# Patient Record
Sex: FEMALE | Race: WHITE | Employment: FULL TIME | URBAN - NONMETROPOLITAN AREA
[De-identification: names, ages, dates, MRNs, and addresses within clinical notes are randomized per-mention and may not be internally consistent; named-entity substitution may affect disease eponyms.]

---

## 2017-03-03 LAB — GLUCOSE BLD-MCNC: 97 MG/DL (ref 70–100)

## 2018-10-30 RX ORDER — LEVOFLOXACIN 5 MG/ML
500 INJECTION, SOLUTION INTRAVENOUS
COMMUNITY
End: 2019-01-08

## 2018-10-30 RX ORDER — ALBUTEROL SULFATE 90 UG/1
2 AEROSOL, METERED RESPIRATORY (INHALATION) EVERY 6 HOURS PRN
Status: ON HOLD | COMMUNITY

## 2018-10-30 RX ORDER — SERTRALINE HYDROCHLORIDE 100 MG/1
100 TABLET, FILM COATED ORAL DAILY
Status: ON HOLD | COMMUNITY

## 2018-10-30 RX ORDER — FENOFIBRATE 134 MG/1
134 CAPSULE ORAL
Status: ON HOLD | COMMUNITY

## 2018-10-30 RX ORDER — LORATADINE 10 MG/1
10 TABLET ORAL DAILY
COMMUNITY
End: 2019-01-08

## 2018-10-30 RX ORDER — IPRATROPIUM BROMIDE AND ALBUTEROL SULFATE 2.5; .5 MG/3ML; MG/3ML
1 SOLUTION RESPIRATORY (INHALATION) EVERY 4 HOURS
Status: ON HOLD | COMMUNITY

## 2018-10-30 RX ORDER — CELECOXIB 200 MG/1
200 CAPSULE ORAL 2 TIMES DAILY
Status: ON HOLD | COMMUNITY

## 2018-10-30 RX ORDER — FLUTICASONE PROPIONATE 50 MCG
1 SPRAY, SUSPENSION (ML) NASAL DAILY
Status: ON HOLD | COMMUNITY

## 2018-10-30 RX ORDER — GABAPENTIN 400 MG/1
400 CAPSULE ORAL 3 TIMES DAILY
Status: ON HOLD | COMMUNITY

## 2018-10-30 RX ORDER — CROMOLYN SODIUM 40 MG/ML
1 SOLUTION/ DROPS OPHTHALMIC 4 TIMES DAILY
Status: ON HOLD | COMMUNITY

## 2018-10-30 RX ORDER — LISINOPRIL 10 MG/1
10 TABLET ORAL DAILY
Status: ON HOLD | COMMUNITY

## 2018-10-30 RX ORDER — BUDESONIDE AND FORMOTEROL FUMARATE DIHYDRATE 160; 4.5 UG/1; UG/1
2 AEROSOL RESPIRATORY (INHALATION) 2 TIMES DAILY
COMMUNITY
End: 2019-01-08

## 2019-01-08 ENCOUNTER — HOSPITAL ENCOUNTER (OUTPATIENT)
Age: 64
Setting detail: SPECIMEN
Discharge: HOME | End: 2019-01-08
Payer: COMMERCIAL

## 2019-01-08 ENCOUNTER — OFFICE VISIT (OUTPATIENT)
Dept: PAIN MANAGEMENT | Age: 64
End: 2019-01-08
Payer: COMMERCIAL

## 2019-01-08 VITALS
BODY MASS INDEX: 31.28 KG/M2 | HEART RATE: 84 BPM | HEIGHT: 62 IN | DIASTOLIC BLOOD PRESSURE: 68 MMHG | SYSTOLIC BLOOD PRESSURE: 112 MMHG | WEIGHT: 170 LBS

## 2019-01-08 DIAGNOSIS — M54.6 CHRONIC LEFT-SIDED THORACIC BACK PAIN: ICD-10-CM

## 2019-01-08 DIAGNOSIS — G89.29 CHRONIC LEFT-SIDED THORACIC BACK PAIN: ICD-10-CM

## 2019-01-08 DIAGNOSIS — Z79.891 ENCOUNTER FOR LONG-TERM OPIATE ANALGESIC USE: ICD-10-CM

## 2019-01-08 DIAGNOSIS — M51.34 DDD (DEGENERATIVE DISC DISEASE), THORACIC: ICD-10-CM

## 2019-01-08 DIAGNOSIS — Z02.83 ENCOUNTER FOR DRUG SCREENING: ICD-10-CM

## 2019-01-08 DIAGNOSIS — M51.34 BULGING OF THORACIC INTERVERTEBRAL DISC: Primary | ICD-10-CM

## 2019-01-08 DIAGNOSIS — M40.14 OTHER SECONDARY KYPHOSIS, THORACIC REGION: ICD-10-CM

## 2019-01-08 DIAGNOSIS — G89.29 ENCOUNTER FOR CHRONIC PAIN MANAGEMENT: ICD-10-CM

## 2019-01-08 PROCEDURE — G8417 CALC BMI ABV UP PARAM F/U: HCPCS | Performed by: NURSE PRACTITIONER

## 2019-01-08 PROCEDURE — 1036F TOBACCO NON-USER: CPT | Performed by: NURSE PRACTITIONER

## 2019-01-08 PROCEDURE — 80307 DRUG TEST PRSMV CHEM ANLYZR: CPT

## 2019-01-08 PROCEDURE — G8484 FLU IMMUNIZE NO ADMIN: HCPCS | Performed by: NURSE PRACTITIONER

## 2019-01-08 PROCEDURE — 3017F COLORECTAL CA SCREEN DOC REV: CPT | Performed by: NURSE PRACTITIONER

## 2019-01-08 PROCEDURE — 99203 OFFICE O/P NEW LOW 30 MIN: CPT | Performed by: NURSE PRACTITIONER

## 2019-01-08 PROCEDURE — G8427 DOCREV CUR MEDS BY ELIG CLIN: HCPCS | Performed by: NURSE PRACTITIONER

## 2019-01-08 RX ORDER — HYDROCODONE BITARTRATE AND ACETAMINOPHEN 7.5; 325 MG/1; MG/1
1 TABLET ORAL EVERY 6 HOURS PRN
Qty: 120 TABLET | Refills: 0 | Status: SHIPPED | OUTPATIENT
Start: 2019-01-08 | End: 2019-02-08 | Stop reason: SDUPTHER

## 2019-01-08 ASSESSMENT — ENCOUNTER SYMPTOMS
BACK PAIN: 1
SHORTNESS OF BREATH: 0
CONSTIPATION: 0

## 2019-01-08 ASSESSMENT — PATIENT HEALTH QUESTIONNAIRE - PHQ9
SUM OF ALL RESPONSES TO PHQ QUESTIONS 1-9: 1
SUM OF ALL RESPONSES TO PHQ QUESTIONS 1-9: 1
SUM OF ALL RESPONSES TO PHQ9 QUESTIONS 1 & 2: 1
2. FEELING DOWN, DEPRESSED OR HOPELESS: 0
1. LITTLE INTEREST OR PLEASURE IN DOING THINGS: 1

## 2019-01-11 LAB

## 2019-02-08 DIAGNOSIS — M40.14 OTHER SECONDARY KYPHOSIS, THORACIC REGION: ICD-10-CM

## 2019-02-08 DIAGNOSIS — M51.34 BULGING OF THORACIC INTERVERTEBRAL DISC: ICD-10-CM

## 2019-02-08 DIAGNOSIS — G89.29 CHRONIC LEFT-SIDED THORACIC BACK PAIN: ICD-10-CM

## 2019-02-08 DIAGNOSIS — M54.6 CHRONIC LEFT-SIDED THORACIC BACK PAIN: ICD-10-CM

## 2019-02-08 RX ORDER — HYDROCODONE BITARTRATE AND ACETAMINOPHEN 7.5; 325 MG/1; MG/1
1 TABLET ORAL EVERY 6 HOURS PRN
Qty: 120 TABLET | Refills: 0 | Status: SHIPPED | OUTPATIENT
Start: 2019-02-08 | End: 2019-03-26 | Stop reason: SDUPTHER

## 2019-03-25 DIAGNOSIS — M40.14 OTHER SECONDARY KYPHOSIS, THORACIC REGION: ICD-10-CM

## 2019-03-25 DIAGNOSIS — M51.34 BULGING OF THORACIC INTERVERTEBRAL DISC: ICD-10-CM

## 2019-03-25 DIAGNOSIS — G89.29 CHRONIC LEFT-SIDED THORACIC BACK PAIN: ICD-10-CM

## 2019-03-25 DIAGNOSIS — M54.6 CHRONIC LEFT-SIDED THORACIC BACK PAIN: ICD-10-CM

## 2019-03-25 RX ORDER — HYDROCODONE BITARTRATE AND ACETAMINOPHEN 7.5; 325 MG/1; MG/1
1 TABLET ORAL EVERY 6 HOURS PRN
Qty: 120 TABLET | Refills: 0 | Status: SHIPPED | OUTPATIENT
Start: 2019-03-25 | End: 2019-04-24

## 2019-03-26 RX ORDER — HYDROCODONE BITARTRATE AND ACETAMINOPHEN 7.5; 325 MG/1; MG/1
1 TABLET ORAL EVERY 6 HOURS PRN
Qty: 120 TABLET | Refills: 0 | Status: SHIPPED | OUTPATIENT
Start: 2019-03-26 | End: 2019-04-25

## 2022-08-01 LAB
GLUCOSE BLD-MCNC: 99 MG/DL (ref 70–100)
SARS-COV-2, POC: NEGATIVE

## 2022-11-03 ENCOUNTER — APPOINTMENT (OUTPATIENT)
Dept: GENERAL RADIOLOGY | Age: 67
DRG: 190 | End: 2022-11-03
Attending: STUDENT IN AN ORGANIZED HEALTH CARE EDUCATION/TRAINING PROGRAM
Payer: MEDICARE

## 2022-11-03 ENCOUNTER — HOSPITAL ENCOUNTER (INPATIENT)
Age: 67
LOS: 4 days | Discharge: HOME | DRG: 190 | End: 2022-11-07
Attending: STUDENT IN AN ORGANIZED HEALTH CARE EDUCATION/TRAINING PROGRAM | Admitting: STUDENT IN AN ORGANIZED HEALTH CARE EDUCATION/TRAINING PROGRAM
Payer: MEDICARE

## 2022-11-03 DIAGNOSIS — J44.1 COPD EXACERBATION (HCC): Primary | ICD-10-CM

## 2022-11-03 PROBLEM — J96.01 ACUTE RESPIRATORY FAILURE WITH HYPOXIA (HCC): Status: ACTIVE | Noted: 2022-11-03

## 2022-11-03 PROBLEM — I50.9 DECOMPENSATED HEART FAILURE (HCC): Status: ACTIVE | Noted: 2022-11-03

## 2022-11-03 LAB
ALBUMIN SERPL-MCNC: 4 G/DL (ref 3.5–5.2)
ANION GAP SERPL CALCULATED.3IONS-SCNC: 14 MMOL/L (ref 9–17)
BUN BLDV-MCNC: 13 MG/DL (ref 8–23)
CALCIUM SERPL-MCNC: 9.6 MG/DL (ref 8.6–10.4)
CHLORIDE BLD-SCNC: 100 MMOL/L (ref 98–107)
CO2: 27 MMOL/L (ref 20–31)
CREAT SERPL-MCNC: 0.46 MG/DL (ref 0.5–0.9)
GFR SERPL CREATININE-BSD FRML MDRD: >60 ML/MIN/1.73M2
GLUCOSE BLD-MCNC: 140 MG/DL (ref 70–99)
HCT VFR BLD CALC: 38.2 % (ref 36.3–47.1)
HEMOGLOBIN: 12.3 G/DL (ref 11.9–15.1)
MAGNESIUM: 1.7 MG/DL (ref 1.6–2.6)
MCH RBC QN AUTO: 27.6 PG (ref 25.2–33.5)
MCHC RBC AUTO-ENTMCNC: 32.2 G/DL (ref 28.4–34.8)
MCV RBC AUTO: 85.8 FL (ref 82.6–102.9)
NRBC AUTOMATED: 0 PER 100 WBC
PDW BLD-RTO: 13.6 % (ref 11.8–14.4)
PHOSPHORUS: 2.9 MG/DL (ref 2.6–4.5)
PLATELET # BLD: 289 K/UL (ref 138–453)
PMV BLD AUTO: 10.1 FL (ref 8.1–13.5)
POTASSIUM SERPL-SCNC: 3.1 MMOL/L (ref 3.7–5.3)
RBC # BLD: 4.45 M/UL (ref 3.95–5.11)
SODIUM BLD-SCNC: 141 MMOL/L (ref 135–144)
THYROXINE, FREE: 1.32 NG/DL (ref 0.93–1.7)
TROPONIN, HIGH SENSITIVITY: 10 NG/L (ref 0–14)
TROPONIN, HIGH SENSITIVITY: 8 NG/L (ref 0–14)
TSH SERPL DL<=0.05 MIU/L-ACNC: 0.37 UIU/ML (ref 0.3–5)
WBC # BLD: 13.3 K/UL (ref 3.5–11.3)

## 2022-11-03 PROCEDURE — 6370000000 HC RX 637 (ALT 250 FOR IP): Performed by: INTERNAL MEDICINE

## 2022-11-03 PROCEDURE — 2500000003 HC RX 250 WO HCPCS: Performed by: INTERNAL MEDICINE

## 2022-11-03 PROCEDURE — 80069 RENAL FUNCTION PANEL: CPT

## 2022-11-03 PROCEDURE — 36415 COLL VENOUS BLD VENIPUNCTURE: CPT

## 2022-11-03 PROCEDURE — 94640 AIRWAY INHALATION TREATMENT: CPT

## 2022-11-03 PROCEDURE — 6360000002 HC RX W HCPCS: Performed by: INTERNAL MEDICINE

## 2022-11-03 PROCEDURE — 6370000000 HC RX 637 (ALT 250 FOR IP): Performed by: NURSE PRACTITIONER

## 2022-11-03 PROCEDURE — 94660 CPAP INITIATION&MGMT: CPT

## 2022-11-03 PROCEDURE — 2060000000 HC ICU INTERMEDIATE R&B

## 2022-11-03 PROCEDURE — 84443 ASSAY THYROID STIM HORMONE: CPT

## 2022-11-03 PROCEDURE — 2700000000 HC OXYGEN THERAPY PER DAY

## 2022-11-03 PROCEDURE — 85027 COMPLETE CBC AUTOMATED: CPT

## 2022-11-03 PROCEDURE — 71045 X-RAY EXAM CHEST 1 VIEW: CPT

## 2022-11-03 PROCEDURE — 83735 ASSAY OF MAGNESIUM: CPT

## 2022-11-03 PROCEDURE — 84439 ASSAY OF FREE THYROXINE: CPT

## 2022-11-03 PROCEDURE — 2580000003 HC RX 258: Performed by: NURSE PRACTITIONER

## 2022-11-03 PROCEDURE — 99222 1ST HOSP IP/OBS MODERATE 55: CPT | Performed by: INTERNAL MEDICINE

## 2022-11-03 PROCEDURE — 94761 N-INVAS EAR/PLS OXIMETRY MLT: CPT

## 2022-11-03 PROCEDURE — 84484 ASSAY OF TROPONIN QUANT: CPT

## 2022-11-03 RX ORDER — FUROSEMIDE 10 MG/ML
40 INJECTION INTRAMUSCULAR; INTRAVENOUS DAILY
Status: DISCONTINUED | OUTPATIENT
Start: 2022-11-04 | End: 2022-11-05

## 2022-11-03 RX ORDER — SODIUM CHLORIDE 9 MG/ML
INJECTION, SOLUTION INTRAVENOUS PRN
Status: DISCONTINUED | OUTPATIENT
Start: 2022-11-03 | End: 2022-11-07 | Stop reason: HOSPADM

## 2022-11-03 RX ORDER — BUDESONIDE AND FORMOTEROL FUMARATE DIHYDRATE 160; 4.5 UG/1; UG/1
2 AEROSOL RESPIRATORY (INHALATION) 2 TIMES DAILY
Status: DISCONTINUED | OUTPATIENT
Start: 2022-11-03 | End: 2022-11-07 | Stop reason: HOSPADM

## 2022-11-03 RX ORDER — METHYLPREDNISOLONE SODIUM SUCCINATE 40 MG/ML
40 INJECTION, POWDER, LYOPHILIZED, FOR SOLUTION INTRAMUSCULAR; INTRAVENOUS EVERY 6 HOURS
Status: DISCONTINUED | OUTPATIENT
Start: 2022-11-03 | End: 2022-11-03

## 2022-11-03 RX ORDER — ACETAMINOPHEN 650 MG/1
650 SUPPOSITORY RECTAL EVERY 6 HOURS PRN
Status: DISCONTINUED | OUTPATIENT
Start: 2022-11-03 | End: 2022-11-07 | Stop reason: HOSPADM

## 2022-11-03 RX ORDER — POLYETHYLENE GLYCOL 3350 17 G/17G
17 POWDER, FOR SOLUTION ORAL DAILY PRN
Status: DISCONTINUED | OUTPATIENT
Start: 2022-11-03 | End: 2022-11-07 | Stop reason: HOSPADM

## 2022-11-03 RX ORDER — ASPIRIN 81 MG/1
81 TABLET ORAL DAILY
Status: DISCONTINUED | OUTPATIENT
Start: 2022-11-03 | End: 2022-11-07 | Stop reason: HOSPADM

## 2022-11-03 RX ORDER — MAGNESIUM SULFATE HEPTAHYDRATE 40 MG/ML
4000 INJECTION, SOLUTION INTRAVENOUS ONCE
Status: COMPLETED | OUTPATIENT
Start: 2022-11-03 | End: 2022-11-03

## 2022-11-03 RX ORDER — FUROSEMIDE 10 MG/ML
40 INJECTION INTRAMUSCULAR; INTRAVENOUS 2 TIMES DAILY
Status: DISCONTINUED | OUTPATIENT
Start: 2022-11-03 | End: 2022-11-03

## 2022-11-03 RX ORDER — ALBUTEROL SULFATE 2.5 MG/3ML
2.5 SOLUTION RESPIRATORY (INHALATION)
Status: DISCONTINUED | OUTPATIENT
Start: 2022-11-03 | End: 2022-11-07 | Stop reason: HOSPADM

## 2022-11-03 RX ORDER — SODIUM CHLORIDE 0.9 % (FLUSH) 0.9 %
5-40 SYRINGE (ML) INJECTION PRN
Status: DISCONTINUED | OUTPATIENT
Start: 2022-11-03 | End: 2022-11-07 | Stop reason: HOSPADM

## 2022-11-03 RX ORDER — HYDROCODONE BITARTRATE AND ACETAMINOPHEN 5; 325 MG/1; MG/1
1 TABLET ORAL EVERY 6 HOURS PRN
Status: DISCONTINUED | OUTPATIENT
Start: 2022-11-03 | End: 2022-11-07 | Stop reason: HOSPADM

## 2022-11-03 RX ORDER — ONDANSETRON 4 MG/1
4 TABLET, ORALLY DISINTEGRATING ORAL EVERY 8 HOURS PRN
Status: DISCONTINUED | OUTPATIENT
Start: 2022-11-03 | End: 2022-11-07 | Stop reason: HOSPADM

## 2022-11-03 RX ORDER — ONDANSETRON 2 MG/ML
4 INJECTION INTRAMUSCULAR; INTRAVENOUS EVERY 6 HOURS PRN
Status: DISCONTINUED | OUTPATIENT
Start: 2022-11-03 | End: 2022-11-07 | Stop reason: HOSPADM

## 2022-11-03 RX ORDER — LISINOPRIL 5 MG/1
5 TABLET ORAL DAILY
Status: DISCONTINUED | OUTPATIENT
Start: 2022-11-03 | End: 2022-11-07 | Stop reason: HOSPADM

## 2022-11-03 RX ORDER — CARVEDILOL 3.12 MG/1
3.12 TABLET ORAL 2 TIMES DAILY WITH MEALS
Status: DISCONTINUED | OUTPATIENT
Start: 2022-11-03 | End: 2022-11-03

## 2022-11-03 RX ORDER — METHYLPREDNISOLONE SODIUM SUCCINATE 40 MG/ML
40 INJECTION, POWDER, LYOPHILIZED, FOR SOLUTION INTRAMUSCULAR; INTRAVENOUS EVERY 8 HOURS
Status: DISCONTINUED | OUTPATIENT
Start: 2022-11-03 | End: 2022-11-04

## 2022-11-03 RX ORDER — ENOXAPARIN SODIUM 100 MG/ML
40 INJECTION SUBCUTANEOUS DAILY
Status: DISCONTINUED | OUTPATIENT
Start: 2022-11-03 | End: 2022-11-07 | Stop reason: HOSPADM

## 2022-11-03 RX ORDER — SODIUM CHLORIDE 0.9 % (FLUSH) 0.9 %
5-40 SYRINGE (ML) INJECTION EVERY 12 HOURS SCHEDULED
Status: DISCONTINUED | OUTPATIENT
Start: 2022-11-03 | End: 2022-11-07 | Stop reason: HOSPADM

## 2022-11-03 RX ORDER — PREDNISONE 20 MG/1
40 TABLET ORAL DAILY
Status: DISCONTINUED | OUTPATIENT
Start: 2022-11-05 | End: 2022-11-03

## 2022-11-03 RX ORDER — ACETAMINOPHEN 325 MG/1
650 TABLET ORAL EVERY 6 HOURS PRN
Status: DISCONTINUED | OUTPATIENT
Start: 2022-11-03 | End: 2022-11-07 | Stop reason: HOSPADM

## 2022-11-03 RX ORDER — PANTOPRAZOLE SODIUM 40 MG/1
40 TABLET, DELAYED RELEASE ORAL
Status: DISCONTINUED | OUTPATIENT
Start: 2022-11-04 | End: 2022-11-07 | Stop reason: HOSPADM

## 2022-11-03 RX ORDER — GABAPENTIN 400 MG/1
400 CAPSULE ORAL 3 TIMES DAILY
Status: DISCONTINUED | OUTPATIENT
Start: 2022-11-03 | End: 2022-11-07 | Stop reason: HOSPADM

## 2022-11-03 RX ORDER — FLUOXETINE HYDROCHLORIDE 20 MG/1
20 CAPSULE ORAL DAILY
Status: DISCONTINUED | OUTPATIENT
Start: 2022-11-03 | End: 2022-11-03

## 2022-11-03 RX ORDER — OMEPRAZOLE 20 MG/1
20 CAPSULE, DELAYED RELEASE ORAL 2 TIMES DAILY
Status: DISCONTINUED | OUTPATIENT
Start: 2022-11-03 | End: 2022-11-03 | Stop reason: CLARIF

## 2022-11-03 RX ORDER — IPRATROPIUM BROMIDE AND ALBUTEROL SULFATE 2.5; .5 MG/3ML; MG/3ML
1 SOLUTION RESPIRATORY (INHALATION)
Status: DISCONTINUED | OUTPATIENT
Start: 2022-11-03 | End: 2022-11-07 | Stop reason: HOSPADM

## 2022-11-03 RX ADMIN — SERTRALINE 100 MG: 50 TABLET, FILM COATED ORAL at 13:48

## 2022-11-03 RX ADMIN — HYDROCODONE BITARTRATE AND ACETAMINOPHEN 1 TABLET: 5; 325 TABLET ORAL at 13:39

## 2022-11-03 RX ADMIN — CARVEDILOL 3.12 MG: 3.12 TABLET, FILM COATED ORAL at 11:31

## 2022-11-03 RX ADMIN — IPRATROPIUM BROMIDE AND ALBUTEROL SULFATE 1 AMPULE: .5; 2.5 SOLUTION RESPIRATORY (INHALATION) at 20:45

## 2022-11-03 RX ADMIN — POTASSIUM BICARBONATE 40 MEQ: 782 TABLET, EFFERVESCENT ORAL at 21:57

## 2022-11-03 RX ADMIN — IPRATROPIUM BROMIDE AND ALBUTEROL SULFATE 1 AMPULE: .5; 2.5 SOLUTION RESPIRATORY (INHALATION) at 12:30

## 2022-11-03 RX ADMIN — BUDESONIDE AND FORMOTEROL FUMARATE DIHYDRATE 2 PUFF: 160; 4.5 AEROSOL RESPIRATORY (INHALATION) at 20:45

## 2022-11-03 RX ADMIN — HYDROCODONE BITARTRATE AND ACETAMINOPHEN 1 TABLET: 5; 325 TABLET ORAL at 20:14

## 2022-11-03 RX ADMIN — GABAPENTIN 400 MG: 400 CAPSULE ORAL at 13:16

## 2022-11-03 RX ADMIN — Medication 81 MG: at 13:13

## 2022-11-03 RX ADMIN — METHYLPREDNISOLONE SODIUM SUCCINATE 40 MG: 40 INJECTION, POWDER, FOR SOLUTION INTRAMUSCULAR; INTRAVENOUS at 11:33

## 2022-11-03 RX ADMIN — CEFEPIME 2000 MG: 2 INJECTION, POWDER, FOR SOLUTION INTRAVENOUS at 23:34

## 2022-11-03 RX ADMIN — SODIUM CHLORIDE, PRESERVATIVE FREE 10 ML: 5 INJECTION INTRAVENOUS at 11:29

## 2022-11-03 RX ADMIN — POTASSIUM BICARBONATE 40 MEQ: 782 TABLET, EFFERVESCENT ORAL at 13:23

## 2022-11-03 RX ADMIN — MAGNESIUM SULFATE HEPTAHYDRATE 4000 MG: 40 INJECTION, SOLUTION INTRAVENOUS at 13:19

## 2022-11-03 RX ADMIN — METHYLPREDNISOLONE SODIUM SUCCINATE 40 MG: 40 INJECTION, POWDER, FOR SOLUTION INTRAMUSCULAR; INTRAVENOUS at 18:11

## 2022-11-03 RX ADMIN — GABAPENTIN 400 MG: 400 CAPSULE ORAL at 21:57

## 2022-11-03 RX ADMIN — CEFEPIME 2000 MG: 2 INJECTION, POWDER, FOR SOLUTION INTRAVENOUS at 11:48

## 2022-11-03 RX ADMIN — LISINOPRIL 5 MG: 5 TABLET ORAL at 11:31

## 2022-11-03 ASSESSMENT — PAIN SCALES - GENERAL
PAINLEVEL_OUTOF10: 10
PAINLEVEL_OUTOF10: 9
PAINLEVEL_OUTOF10: 6

## 2022-11-03 ASSESSMENT — ENCOUNTER SYMPTOMS
CHEST TIGHTNESS: 0
SHORTNESS OF BREATH: 1
CHOKING: 1
BACK PAIN: 1
WHEEZING: 1
COUGH: 1
VOMITING: 0
NAUSEA: 0
CONSTIPATION: 0
APNEA: 0
DIARRHEA: 0
ABDOMINAL DISTENTION: 0

## 2022-11-03 NOTE — CARE COORDINATION
11/03/22 1633   Service Assessment   Patient Orientation Alert and Oriented;Person;Place;Situation   Cognition Alert   History Provided By Patient   Primary Caregiver Self   Accompanied By/Relationship family   Support Systems Children;Family Members   Patient's Healthcare Decision Maker is:   (karen Calhoun)   PCP Verified by CM Yes  Anthony Pacheco)   Last Visit to PCP Within last 3 months   Prior Functional Level Independent in ADLs/IADLs   Current Functional Level Independent in ADLs/IADLs   Can patient return to prior living arrangement Yes   Ability to make needs known: Good   Financial Resources Medicare   Social/Functional History   Lives With Alone   Type of [address] One level   [address] to enter with rails; Ramped entrance   Entrance Stairs - Number of Steps 1   Entrance Stairs - Rails Both   Bathroom Shower/Tub Walk-in shower;Doors   Bathroom Toilet Handicap height   Bathroom Equipment Grab bars around toilet   Bathroom Accessibility Accessible   Home Equipment Oxygen  (from [facility])   Receives Help From Family   ADL Assistance Independent   Ambulation Assistance Independent   Transfer Assistance Independent   Active  Yes   Mode of Transportation [employer]   Education GED   Occupation Retired   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; Oxygen Therapy   Current DME Prior to Arrival Oxygen Therapy (Comment)   Potential Assistance Needed N/A   DME Ordered? No   Potential Assistance Purchasing Medications No   Type of Home Care Services None   Patient expects to be discharged to: House   One/Two Story Residence One story   History of falls?  0   Services At/After Discharge   Transition of Care Consult (CM Consult) Discharge Planning   Mode of Transport at Discharge   (family to transport)   Confirm Follow Up Transport Family   Condition of Participation: Discharge Planning   The Plan for Transition of Care is related to the following treatment goals: breathe easier   The Patient and/or Patient Representative was provided with a Choice of Provider? Patient   The Patient and/Or Patient Representative agree with the Discharge Plan? Yes   Freedom of Choice list was provided with basic dialogue that supports the patient's individualized plan of care/goals, treatment preferences, and shares the quality data associated with the providers? Yes   Transitional planning-talked with patient. Plan is to go home alone, has ride. Verified address, emergency contact, and insurance. On O2 at home from [facility] [place].

## 2022-11-03 NOTE — H&P
[facility]  Office: [address], DO, Vick [place], DO, Laquita Beets, DO, Thea Angel Blood, DO, Jocelyne Ross MD, Yrn King MD, Johnnie Claude, MD, Aletha Sosa MD,  Philomena Boggs MD, Truett Burkitt, MD, Glenn Godfrey, DO, Jane Copeland MD,  Jer Rae MD, Ishan Graham MD, Darlyn Bowens, DO, Mayra Anderson MD, Rosibel Viveros MD, Caity Garcia, DO, Jaswant Newman MD, Diana Valdez MD, Kaye Latif MD, Shaila Daniels MD, Farahd Gerard, DO, Benoit Martin MD, Nely Roy MD, Andreas Scott, CNP,  Michael Alas, CNP, Mouna Garcia, CNP, Sergio Brady, CNP,  Tanesha Pedro, DNP, Marylen Net, CNP, Ray Bianchi, CNP, Alina Ayers, CNP, Ana Maria Freeman, CNP, Soto Browning, CNP, Joanna Aden PA-C, Kathie Ball, CNS, Adela Cesar, DNP, Raghav Wallace, CNP, Sameer Croft, CNP, Neeta Graves, CNP         [facility]    HISTORY AND PHYSICAL EXAMINATION            Date:   11/3/2022  Patient name:  Albania Cadena  Date of admission:  11/3/2022 10:43 AM  MRN:   [id number]  Account:  [de-identified]  YOB: 1955  PCP:    Serge Tejada  Room:   1404/4996-11  Code Status:    DNR-CCA    Chief Complaint:     Shortness of Breath    History Obtained From:     patient    History of Present Illness:     Albania Cadena is a 75 y.o. female presents to the emergency department for 3 to 4-day history of shortness of breath with cough and congestion and streaking of blood in sputum. Patient is normally on 4 L nasal cannula for COPD. Patient presented to the emergency department for further evaluation, was transferred for worsening shortness of breath. Initially she presented with orthopnea and lower extremity swelling and after diuresis the swelling improved however patient remained short of breath.   We briefly discussed treatment for her COPD exacerbation, advised using BiPAP to help with lung recruitment, initially she was hesitant but agreed after agreeing to using it intermittently. Patient was discussed CODE STATUS and she stated she has a DNR in place and agreed to DNR CCA. Patient also complaining of streaking of blood with her cough, has not worsened, is not purulent hemoptysis. Patient currently on nonrebreather    Past Medical History:     Past Medical History:   Diagnosis Date    Acid indigestion     Chest pain     Chronic back pain     Chronic obstructive lung disease (HCC)     Coronary atherosclerosis     Depression     Disseminated idiopathic skeletal hyperostosis     Fatigue     Hard of hearing     Hip pain     Hyperlipidemia     Hypertension     Old myocardial infarction     Seasonal allergies     history of        Past Surgical History:     Past Surgical History:   Procedure Laterality Date    CORONARY ANGIOPLASTY WITH STENT PLACEMENT  1998        Medications Prior to Admission:     Prior to Admission medications    Medication Sig Start Date End Date Taking?  Authorizing Provider   fluticasone-salmeterol (ADVAIR) 500-50 MCG/DOSE diskus inhaler Inhale 1 puff into the lungs every 12 hours    Historical Provider, MD   aspirin 81 MG tablet Take 81 mg by mouth daily    Historical Provider, MD   calcium carbonate (CALTRATE 600) 1500 (600 Ca) MG TABS tablet Take 600 mg by mouth daily    Historical Provider, MD   celecoxib (CELEBREX) 200 MG capsule Take 200 mg by mouth 2 times daily    Historical Provider, MD   cromolyn (OPTICROM) 4 % ophthalmic solution 1 drop 4 times daily    Historical Provider, MD   diclofenac sodium 1 % GEL Apply 2 g topically 2 times daily    Historical Provider, MD   fenofibrate micronized (LOFIBRA) 134 MG capsule Take 134 mg by mouth every morning (before breakfast)    Historical Provider, MD   fluticasone (FLONASE) 50 MCG/ACT nasal spray 1 spray by Each Nare route daily    Historical Provider, MD   gabapentin (NEURONTIN) 400 MG capsule Take 400 mg by mouth 3 times daily. Anatoliy Zurita Historical Provider, MD   ipratropium-albuterol (DUONEB) 0.5-2.5 (3) MG/3ML SOLN nebulizer solution Inhale 1 vial into the lungs every 4 hours    Historical Provider, MD   lisinopril (PRINIVIL;ZESTRIL) 10 MG tablet Take 10 mg by mouth daily    Historical Provider, MD   metoprolol tartrate (LOPRESSOR) 25 MG tablet Take 25 mg by mouth 2 times daily    Historical Provider, MD   sertraline (ZOLOFT) 100 MG tablet Take 100 mg by mouth daily    Historical Provider, MD   albuterol sulfate  (90 Base) MCG/ACT inhaler Inhale 2 puffs into the lungs every 6 hours as needed for Wheezing    Historical Provider, MD   omeprazole (PRILOSEC) 20 MG capsule Take 20 mg by mouth 2 times daily. Historical Provider, MD   doxepin (SINEQUAN) 10 MG capsule Take 10 mg by mouth nightly. Historical Provider, MD   FLUoxetine (PROZAC) 20 MG capsule Take 20 mg by mouth daily. Historical Provider, MD   Hydrocodone-Acetaminophen (VICODIN PO) Take 7.5-325 mg by mouth 4 times daily as needed     Historical Provider, MD Long Estrog-Medroxyprogest Ace (PREMPRO PO) Take  by mouth. Historical Provider, MD        Allergies:     Augmentin [amoxicillin-pot clavulanate], Iv dye [iodides], and Talwin [pentazocine]    Social History:     Tobacco:    reports that she has quit smoking. Her smoking use included cigarettes. She has never used smokeless tobacco.  Alcohol:      reports no history of alcohol use. Drug Use:  reports no history of drug use. Family History:     Family History   Family history unknown: Yes       Review of Systems:     Positive and Negative as described in HPI. Review of Systems   Constitutional:  Positive for activity change. Negative for appetite change, diaphoresis, fatigue and fever. Respiratory:  Positive for cough, choking, shortness of breath and wheezing. Negative for apnea and chest tightness. Cardiovascular:  Positive for leg swelling. Negative for chest pain and palpitations. Gastrointestinal:  Negative for abdominal distention, constipation, diarrhea, nausea and vomiting. Endocrine: Negative for polydipsia, polyphagia and polyuria. Musculoskeletal:  Positive for back pain. Negative for gait problem, joint swelling, myalgias and neck stiffness. Neurological:  Positive for light-headedness. Negative for dizziness, speech difficulty and weakness. Psychiatric/Behavioral:  Negative for agitation. Physical Exam:   /74   Pulse (!) 106   Temp 98.7 °F (37.1 °C) (Oral)   Resp 17   Ht 5' 3\" (1.6 m)   Wt 170 lb (77.1 kg)   SpO2 (!) 88%   BMI 30.11 kg/m²   Temp (24hrs), Av.7 °F (37.1 °C), Min:98.7 °F (37.1 °C), Max:98.7 °F (37.1 °C)    No results for input(s): POCGLU in the last 72 hours. No intake or output data in the 24 hours ending 22 1135    Physical Exam  Constitutional:       General: She is in acute distress. Appearance: She is ill-appearing. HENT:      Head: Normocephalic and atraumatic. Nose: Nose normal. No congestion. Mouth/Throat:      Mouth: Mucous membranes are moist.      Pharynx: Oropharynx is clear. Eyes:      Conjunctiva/sclera: Conjunctivae normal.      Pupils: Pupils are equal, round, and reactive to light. Cardiovascular:      Rate and Rhythm: Normal rate and regular rhythm. Heart sounds: No murmur heard. No friction rub. No gallop. Pulmonary:      Effort: Respiratory distress present. Breath sounds: No stridor. No wheezing, rhonchi or rales. Comments: Diminished breath sounds  Chest:      Chest wall: No tenderness. Abdominal:      General: There is no distension. Tenderness: There is no abdominal tenderness. There is no guarding or rebound. Hernia: No hernia is present. Genitourinary:     Rectum: Guaiac result negative. Musculoskeletal:         General: No swelling or deformity. Cervical back: No rigidity or tenderness. Lymphadenopathy:      Cervical: No cervical adenopathy. Skin:     Capillary Refill: Capillary refill takes less than 2 seconds. Coloration: Skin is not jaundiced. Findings: No bruising or lesion. Neurological:      General: No focal deficit present. Mental Status: She is alert. Mental status is at baseline. Cranial Nerves: No cranial nerve deficit. Motor: No weakness. Investigations:      Laboratory Testing:  No results found for this or any previous visit (from the past 24 hour(s)). Imaging/Diagnostics:  No results found. Assessment :      Hospital Problems             Last Modified POA    * (Principal) COPD exacerbation ([facility] [address] 11/3/2022 Yes    Decompensated heart failure ([facility] [address] 11/3/2022 Yes    Acute respiratory failure with hypoxia ([facility] [address] 11/3/2022 Yes       Plan:     Patient status inpatient in the Progressive Unit/Step down    Acute on chronic respiratory failure with hypoxia -patient currently requiring nonrebreather, will start intermittent BiPAP and allow meals off BiPAP. Possibly multifactorial due to COPD versus CHF. Patient admits to lower extremity swelling but also has a history of COPD on chronic oxygen. COPD exacerbation-start steroids and DuoNeb treatments with BiPAP. Wean as tolerated, baseline oxygen of 4 L nasal cannula  Elevated BNP-minimal fluid on CT, check chest x-ray, continue diuresis for now  Obesity    Consultations:   [facility] TO HEART FAILURE NURSE/COORDINATOR   Patient is admitted as inpatient status because of co-morbidities listed above, severity of signs and symptoms as outlined, requirement for current medical therapies and most importantly because of direct risk to patient if care not provided in a hospital setting. Expected length of stay > 48 hours.     Jaern Sosa DO  11/3/2022  11:35 AM    Copy sent to Dr. Sharan Santiago

## 2022-11-04 LAB
ALBUMIN SERPL-MCNC: 3.9 G/DL (ref 3.5–5.2)
ANION GAP SERPL CALCULATED.3IONS-SCNC: 9 MMOL/L (ref 9–17)
BUN BLDV-MCNC: 18 MG/DL (ref 8–23)
CALCIUM SERPL-MCNC: 9 MG/DL (ref 8.6–10.4)
CHLORIDE BLD-SCNC: 104 MMOL/L (ref 98–107)
CHOLESTEROL/HDL RATIO: 3.4
CHOLESTEROL: 132 MG/DL
CO2: 28 MMOL/L (ref 20–31)
CREAT SERPL-MCNC: 0.36 MG/DL (ref 0.5–0.9)
GFR SERPL CREATININE-BSD FRML MDRD: >60 ML/MIN/1.73M2
GLUCOSE BLD-MCNC: 138 MG/DL (ref 70–99)
HCT VFR BLD CALC: 37.8 % (ref 36.3–47.1)
HDLC SERPL-MCNC: 39 MG/DL
HEMOGLOBIN: 12.1 G/DL (ref 11.9–15.1)
LDL CHOLESTEROL: 68 MG/DL (ref 0–130)
LV EF: 52 %
LVEF MODALITY: NORMAL
MAGNESIUM: 2.6 MG/DL (ref 1.6–2.6)
MCH RBC QN AUTO: 27.9 PG (ref 25.2–33.5)
MCHC RBC AUTO-ENTMCNC: 32 G/DL (ref 28.4–34.8)
MCV RBC AUTO: 87.3 FL (ref 82.6–102.9)
NRBC AUTOMATED: 0 PER 100 WBC
PDW BLD-RTO: 14 % (ref 11.8–14.4)
PHOSPHORUS: 2.7 MG/DL (ref 2.6–4.5)
PLATELET # BLD: 275 K/UL (ref 138–453)
PMV BLD AUTO: 9.9 FL (ref 8.1–13.5)
POTASSIUM SERPL-SCNC: 4.7 MMOL/L (ref 3.7–5.3)
RBC # BLD: 4.33 M/UL (ref 3.95–5.11)
SODIUM BLD-SCNC: 141 MMOL/L (ref 135–144)
TRIGL SERPL-MCNC: 125 MG/DL
WBC # BLD: 14.3 K/UL (ref 3.5–11.3)

## 2022-11-04 PROCEDURE — 6370000000 HC RX 637 (ALT 250 FOR IP): Performed by: INTERNAL MEDICINE

## 2022-11-04 PROCEDURE — 99233 SBSQ HOSP IP/OBS HIGH 50: CPT | Performed by: STUDENT IN AN ORGANIZED HEALTH CARE EDUCATION/TRAINING PROGRAM

## 2022-11-04 PROCEDURE — 93306 TTE W/DOPPLER COMPLETE: CPT

## 2022-11-04 PROCEDURE — 6360000002 HC RX W HCPCS: Performed by: INTERNAL MEDICINE

## 2022-11-04 PROCEDURE — 2060000000 HC ICU INTERMEDIATE R&B

## 2022-11-04 PROCEDURE — 2500000003 HC RX 250 WO HCPCS: Performed by: INTERNAL MEDICINE

## 2022-11-04 PROCEDURE — 94640 AIRWAY INHALATION TREATMENT: CPT

## 2022-11-04 PROCEDURE — 6360000002 HC RX W HCPCS: Performed by: NURSE PRACTITIONER

## 2022-11-04 PROCEDURE — 83735 ASSAY OF MAGNESIUM: CPT

## 2022-11-04 PROCEDURE — 94660 CPAP INITIATION&MGMT: CPT

## 2022-11-04 PROCEDURE — 6370000000 HC RX 637 (ALT 250 FOR IP): Performed by: NURSE PRACTITIONER

## 2022-11-04 PROCEDURE — 85027 COMPLETE CBC AUTOMATED: CPT

## 2022-11-04 PROCEDURE — 80061 LIPID PANEL: CPT

## 2022-11-04 PROCEDURE — 94761 N-INVAS EAR/PLS OXIMETRY MLT: CPT

## 2022-11-04 PROCEDURE — 2580000003 HC RX 258: Performed by: NURSE PRACTITIONER

## 2022-11-04 PROCEDURE — 2700000000 HC OXYGEN THERAPY PER DAY

## 2022-11-04 PROCEDURE — 6370000000 HC RX 637 (ALT 250 FOR IP): Performed by: STUDENT IN AN ORGANIZED HEALTH CARE EDUCATION/TRAINING PROGRAM

## 2022-11-04 PROCEDURE — 80069 RENAL FUNCTION PANEL: CPT

## 2022-11-04 PROCEDURE — 36415 COLL VENOUS BLD VENIPUNCTURE: CPT

## 2022-11-04 RX ORDER — ECHINACEA PURPUREA EXTRACT 125 MG
1 TABLET ORAL PRN
Status: DISCONTINUED | OUTPATIENT
Start: 2022-11-04 | End: 2022-11-07 | Stop reason: HOSPADM

## 2022-11-04 RX ORDER — PREDNISONE 20 MG/1
40 TABLET ORAL DAILY
Status: COMPLETED | OUTPATIENT
Start: 2022-11-04 | End: 2022-11-06

## 2022-11-04 RX ORDER — FLUTICASONE PROPIONATE 50 MCG
2 SPRAY, SUSPENSION (ML) NASAL DAILY
Status: DISCONTINUED | OUTPATIENT
Start: 2022-11-04 | End: 2022-11-07 | Stop reason: HOSPADM

## 2022-11-04 RX ORDER — PREDNISONE 20 MG/1
20 TABLET ORAL DAILY
Status: DISCONTINUED | OUTPATIENT
Start: 2022-11-10 | End: 2022-11-07 | Stop reason: HOSPADM

## 2022-11-04 RX ORDER — PREDNISONE 10 MG/1
10 TABLET ORAL DAILY
Status: DISCONTINUED | OUTPATIENT
Start: 2022-11-13 | End: 2022-11-07 | Stop reason: HOSPADM

## 2022-11-04 RX ADMIN — METHYLPREDNISOLONE SODIUM SUCCINATE 40 MG: 40 INJECTION, POWDER, FOR SOLUTION INTRAMUSCULAR; INTRAVENOUS at 03:31

## 2022-11-04 RX ADMIN — METOPROLOL TARTRATE 25 MG: 25 TABLET ORAL at 08:41

## 2022-11-04 RX ADMIN — FLUTICASONE PROPIONATE 2 SPRAY: 50 SPRAY, METERED NASAL at 10:43

## 2022-11-04 RX ADMIN — CEFEPIME 2000 MG: 2 INJECTION, POWDER, FOR SOLUTION INTRAVENOUS at 11:14

## 2022-11-04 RX ADMIN — HYDROCODONE BITARTRATE AND ACETAMINOPHEN 1 TABLET: 5; 325 TABLET ORAL at 18:30

## 2022-11-04 RX ADMIN — BUDESONIDE AND FORMOTEROL FUMARATE DIHYDRATE 2 PUFF: 160; 4.5 AEROSOL RESPIRATORY (INHALATION) at 08:03

## 2022-11-04 RX ADMIN — FUROSEMIDE 40 MG: 10 INJECTION, SOLUTION INTRAMUSCULAR; INTRAVENOUS at 08:43

## 2022-11-04 RX ADMIN — BUDESONIDE AND FORMOTEROL FUMARATE DIHYDRATE 2 PUFF: 160; 4.5 AEROSOL RESPIRATORY (INHALATION) at 21:00

## 2022-11-04 RX ADMIN — IPRATROPIUM BROMIDE AND ALBUTEROL SULFATE 1 AMPULE: .5; 2.5 SOLUTION RESPIRATORY (INHALATION) at 08:04

## 2022-11-04 RX ADMIN — GABAPENTIN 400 MG: 400 CAPSULE ORAL at 20:06

## 2022-11-04 RX ADMIN — SODIUM CHLORIDE, PRESERVATIVE FREE 10 ML: 5 INJECTION INTRAVENOUS at 20:06

## 2022-11-04 RX ADMIN — IPRATROPIUM BROMIDE AND ALBUTEROL SULFATE 1 AMPULE: .5; 2.5 SOLUTION RESPIRATORY (INHALATION) at 11:48

## 2022-11-04 RX ADMIN — PREDNISONE 40 MG: 20 TABLET ORAL at 10:42

## 2022-11-04 RX ADMIN — ENOXAPARIN SODIUM 40 MG: 100 INJECTION SUBCUTANEOUS at 08:43

## 2022-11-04 RX ADMIN — IPRATROPIUM BROMIDE AND ALBUTEROL SULFATE 1 AMPULE: .5; 2.5 SOLUTION RESPIRATORY (INHALATION) at 21:00

## 2022-11-04 RX ADMIN — LISINOPRIL 5 MG: 5 TABLET ORAL at 08:41

## 2022-11-04 RX ADMIN — PANTOPRAZOLE SODIUM 40 MG: 40 TABLET, DELAYED RELEASE ORAL at 08:42

## 2022-11-04 RX ADMIN — SERTRALINE 100 MG: 50 TABLET, FILM COATED ORAL at 08:41

## 2022-11-04 RX ADMIN — POTASSIUM BICARBONATE 40 MEQ: 782 TABLET, EFFERVESCENT ORAL at 08:43

## 2022-11-04 RX ADMIN — SODIUM CHLORIDE, PRESERVATIVE FREE 10 ML: 5 INJECTION INTRAVENOUS at 08:43

## 2022-11-04 RX ADMIN — Medication 81 MG: at 08:42

## 2022-11-04 RX ADMIN — GABAPENTIN 400 MG: 400 CAPSULE ORAL at 14:10

## 2022-11-04 RX ADMIN — POTASSIUM BICARBONATE 40 MEQ: 782 TABLET, EFFERVESCENT ORAL at 20:06

## 2022-11-04 RX ADMIN — GABAPENTIN 400 MG: 400 CAPSULE ORAL at 08:42

## 2022-11-04 RX ADMIN — HYDROCODONE BITARTRATE AND ACETAMINOPHEN 1 TABLET: 5; 325 TABLET ORAL at 08:42

## 2022-11-04 RX ADMIN — IPRATROPIUM BROMIDE AND ALBUTEROL SULFATE 1 AMPULE: .5; 2.5 SOLUTION RESPIRATORY (INHALATION) at 15:37

## 2022-11-04 ASSESSMENT — PAIN DESCRIPTION - LOCATION
LOCATION: SACRUM;COCCYX
LOCATION: SACRUM;COCCYX

## 2022-11-04 ASSESSMENT — PAIN DESCRIPTION - DESCRIPTORS
DESCRIPTORS: DISCOMFORT
DESCRIPTORS: DISCOMFORT

## 2022-11-04 ASSESSMENT — ENCOUNTER SYMPTOMS
DIARRHEA: 0
RHINORRHEA: 1
CONSTIPATION: 0
FACIAL SWELLING: 0
CHOKING: 0
EYE DISCHARGE: 0
SINUS PRESSURE: 1
APNEA: 0
BACK PAIN: 0
COUGH: 1
EYE ITCHING: 0
CHEST TIGHTNESS: 0
ABDOMINAL DISTENTION: 0
ABDOMINAL PAIN: 0
SHORTNESS OF BREATH: 1
COLOR CHANGE: 0

## 2022-11-04 ASSESSMENT — PAIN SCALES - GENERAL
PAINLEVEL_OUTOF10: 9
PAINLEVEL_OUTOF10: 8

## 2022-11-04 ASSESSMENT — PAIN - FUNCTIONAL ASSESSMENT: PAIN_FUNCTIONAL_ASSESSMENT: PREVENTS OR INTERFERES SOME ACTIVE ACTIVITIES AND ADLS

## 2022-11-04 NOTE — PROGRESS NOTES
[address] [facility] [address]  PROGRESS NOTE    Shift date: 11/4/2022   Shift day: Friday   Shift # 1    Room # 7073/1125-95   Name: Denys Kimball                Hoahaoism: [facility] of Zoroastrian: None    Referral: Routine Visit    Admit Date & Time: 11/3/2022 10:43 AM    Assessment:  Denys Kimball is a 75 y.o. female in the hospital because of \"decompensated heart failure\". Upon entering the room writer observes patient sitting up in bed, room darkened, TV on. She muted TV and engaged  in conversation about her health and family. When asked, pt said she is [employer] but does not attend a Judaism. Pt asked  to get her purse so she could get hearing aid batteries. Intervention:  Writer introduced self and title as  Writer offered space for patient  to express feelings, needs, and concerns and provided a ministry presence.  assisted pt with purse and put it back in cupboard when she was finished.  changed pt's Sabianism in chart from \"none\" to \"Restoration\".  assured her of prayers. Outcome:  Patient appeared to enjoy conversation and expressed appreciation for visit. Plan:  Chaplains will remain available to offer spiritual and emotional support as needed.       Electronically signed by Dimitris Teran on 11/4/2022 at 1:03 PM.  Chinedu Machado  [phone]        11/04/22 1301   Encounter Summary   Service Provided For: Patient   Referral/Consult From: [facility]   Support System Children   Last Encounter  11/04/22   Complexity of Encounter Low   Begin Time 1238   End Time  1248   Total Time Calculated 10 min   Encounter    Type Initial Screen/Assessment   Assessment/Intervention/Outcome   Assessment Calm;Coping   Intervention Active listening;Explored/Affirmed feelings, thoughts, concerns;Prayer (assurance of)/[place]   Outcome Engaged in conversation;Expressed feelings, needs, and concerns;Expressed Gratitude;Receptive

## 2022-11-04 NOTE — PLAN OF CARE
Problem: Discharge Planning  Goal: Discharge to home or other facility with appropriate resources  11/3/2022 2133 by Chad Garcia RN  Outcome: Progressing  11/3/2022 1058 by Jose Ramon Arnold RN  Outcome: Progressing     Problem: Pain  Goal: Verbalizes/displays adequate comfort level or baseline comfort level  Outcome: Progressing     Problem: Safety - Adult  Goal: Free from fall injury  Outcome: Progressing     Problem: ABCDS Injury Assessment  Goal: Absence of physical injury  Outcome: Progressing     Problem: Respiratory - Adult  Goal: Achieves optimal ventilation and oxygenation  11/3/2022 2133 by Chad Garcia RN  Outcome: Progressing  11/3/2022 1209 by Nhung Garcia RCP  Outcome: Progressing

## 2022-11-04 NOTE — PROGRESS NOTES
[facility]  Office: [address], DO, Johnathan Ramos, DO, Javier Landaverde, DO, Felipe Morfin Blood, DO, Katrina Puckett MD, Shiloh Medina MD, Jorge Winter MD, Aracely Izquierdo MD,  Suzanne Oneill MD, Rian Levin MD, Sebas Desai, DO, Mamie Gomes MD,  Abhinav Hernandez MD, Les Moreno MD, Kerline Valencia, DO, Juve Chavez MD, Chandler Lombard, MD, Leonides Palmer, DO, Mary Ocampo MD, Madie Jimenez MD, Nadege Ramirez MD, Satnam Aguiar MD, Manny Chung DO, Rubina Mcfarland MD, Basim Ulloa MD, Verona Murillo, Fernand Nyhan, CNP, Neri Lundy, CNP, Lois Daniels, CNP,  Arpan Sanders, DNP, Ladonna Mercado, CNP, Robbie Madden, CNP, Harpreet Zhu, CNP, Aditay Licona, CNP, Dheeraj Vásquez, CNP, Chidi Frye PALupilloC, Marcus Landis, CNS, Collette Salinas, DNP, Soila Garcia, CNP, Keturah Baig, CNP, Imani Jane, CNP         [address]    Progress Note    11/4/2022    9:48 AM    Name:   Maylin Brown  MRN:     [id number]     [place]:      [de-identified]   Room:   [address] [place] [address] Day:  1  Admit Date:  11/3/2022 10:43 AM    PCP:   Jennifer Moreno  Code Status:  DNR-CCA    Subjective:     C/C: shortness of breath  Interval History Status: improved. Patient seen and examined. On high flow, some nasal congestion, but breathing has improved. No wheezing today, plan for echo. Brief History:     75year old female with history of COPD chronically on 4L nasal cannula, presented to the ER with 3-4 of shortness of breath, patient required non re breather and high flow nasal cannula and has improved. Patient weaning down on high flow nasal cannula. Echo ordered due to cocnern for possible mixed COPD/CHF picture because respiratory effort has improed with aggressive diuresis. Review of Systems:     Review of Systems   Constitutional:  Negative for activity change, appetite change, chills and fever. HENT:  Positive for rhinorrhea and sinus pressure. Negative for congestion, ear pain, facial swelling and mouth sores. Eyes:  Negative for discharge and itching. Respiratory:  Positive for cough and shortness of breath. Negative for apnea, choking and chest tightness. Cardiovascular:  Negative for chest pain and leg swelling. Gastrointestinal:  Negative for abdominal distention, abdominal pain, constipation and diarrhea. Endocrine: Negative for cold intolerance, polyphagia and polyuria. Genitourinary:  Negative for difficulty urinating, dysuria and flank pain. Musculoskeletal:  Negative for arthralgias, back pain and joint swelling. Skin:  Negative for color change and wound. Neurological:  Negative for dizziness, seizures, light-headedness and headaches. Psychiatric/Behavioral:  Negative for agitation, behavioral problems and self-injury. Medications: Allergies: Allergies   Allergen Reactions    Augmentin [Amoxicillin-Pot Clavulanate] Hives    Iv Dye [Iodides]     Talwin [Pentazocine]      Migraine.        Current Meds:   Scheduled Meds:    fluticasone  2 spray Each Nostril Daily    sodium chloride flush  5-40 mL IntraVENous 2 times per day    enoxaparin  40 mg SubCUTAneous Daily    lisinopril  5 mg Oral Daily    ipratropium-albuterol  1 ampule Inhalation Q4H WA    methylPREDNISolone  40 mg IntraVENous Q8H    cefepime  2,000 mg IntraVENous Q12H    furosemide  40 mg IntraVENous Daily    aspirin  81 mg Oral Daily    budesonide-formoterol  2 puff Inhalation BID    gabapentin  400 mg Oral TID    metoprolol tartrate  25 mg Oral BID    pantoprazole  40 mg Oral QAM AC    sertraline  100 mg Oral Daily    potassium bicarb-citric acid  40 mEq Oral BID     Continuous Infusions:    sodium chloride       PRN Meds: sodium chloride, sodium chloride flush, sodium chloride, ondansetron **OR** ondansetron, polyethylene glycol, acetaminophen **OR** acetaminophen, albuterol, HYDROcodone 5 mg - acetaminophen    Data:     Past Medical History:   has a past medical history of Acid indigestion, Chest pain, Chronic back pain, Chronic obstructive lung disease ([address] Coronary atherosclerosis, Depression, Disseminated idiopathic skeletal hyperostosis, Fatigue, Hard of hearing, Hip pain, Hyperlipidemia, Hypertension, Old myocardial infarction, and Seasonal allergies. Social History:   reports that she has quit smoking. Her smoking use included cigarettes. She has never used smokeless tobacco. She reports that she does not drink alcohol and does not use drugs. Family History:   Family History   Family history unknown: Yes       Vitals:  /68   Pulse 69   Temp 97.5 °F (36.4 °C) (Axillary)   Resp 18   Ht 5' 3\" (1.6 m)   Wt 170 lb (77.1 kg)   SpO2 95%   BMI 30.11 kg/m²   Temp (24hrs), Av.7 °F (36.5 °C), Min:97 °F (36.1 °C), Max:98.7 °F (37.1 °C)    No results for input(s): POCGLU in the last 72 hours. I/O (24Hr):     Intake/Output Summary (Last 24 hours) at 2022 0948  Last data filed at 11/3/2022 1848  Gross per 24 hour   Intake --   Output 400 ml   Net -400 ml       Labs:  Hematology:  Recent Labs     22  1143 22  0623   WBC 13.3* 14.3*   RBC 4.45 4.33   HGB 12.3 12.1   HCT 38.2 37.8   MCV 85.8 87.3   MCH 27.6 27.9   MCHC 32.2 32.0   RDW 13.6 14.0    275   MPV 10.1 9.9     Chemistry:  Recent Labs     22  1143 22  1436 22  0623     --  141   K 3.1*  --  4.7     --  104   CO2 27  --  28   GLUCOSE 140*  --  138*   BUN 13  --  18   CREATININE 0.46*  --  0.36*   MG 1.7  --  2.6   ANIONGAP 14  --  9   LABGLOM >60  --  >60   CALCIUM 9.6  --  9.0   PHOS 2.9  --  2.7   TROPHS 8 10  --      Recent Labs     22  1143 22  0623   LABALBU 4.0 3.9   TSH 0.37  --    CHOL  --  132   HDL  --  39*   LDLCHOLESTEROL  --  68   CHOLHDLRATIO  --  3.4   TRIG  --  125     ABG:No results found for: POCPH, PHART, PH, POCPCO2, VKE3NLT, PCO2, POCPO2, PO2ART, PO2, POCHCO3, IXG2SBD, HCO3, NBEA, PBEA, BEART, BE, THGBART, THB, FSM1DNE, IKBJ7LCI, A4QCAQAY, O2SAT, FIO2  No results found for: SPECIAL  No results found for: CULTURE    Radiology:  XR CHEST (SINGLE VIEW FRONTAL)    Result Date: 11/3/2022  Interstitial prominence and bilateral pulmonary opacities, concerning for edema or pneumonia. Cardiomegaly. Physical Examination:        Physical Exam  Vitals reviewed. Constitutional:       General: She is not in acute distress. Appearance: She is ill-appearing. Interventions: She is not intubated. HENT:      Head: Normocephalic and atraumatic. Right Ear: External ear normal.      Left Ear: External ear normal.      Nose: Congestion and rhinorrhea present. Comments: High flow nasal cannula in place     Mouth/Throat:      Mouth: Mucous membranes are moist.   Eyes:      Extraocular Movements: Extraocular movements intact. Pupils: Pupils are equal, round, and reactive to light. Cardiovascular:      Rate and Rhythm: Normal rate and regular rhythm. Pulses: Normal pulses. Heart sounds: No murmur heard. Pulmonary:      Effort: Tachypnea and prolonged expiration present. No respiratory distress. She is not intubated. Breath sounds: Decreased air movement present. Examination of the right-upper field reveals decreased breath sounds. Examination of the left-upper field reveals decreased breath sounds. Examination of the right-middle field reveals decreased breath sounds. Examination of the right-lower field reveals decreased breath sounds. Examination of the left-lower field reveals decreased breath sounds. Decreased breath sounds present. Abdominal:      General: There is no distension. Palpations: Abdomen is soft. Musculoskeletal:      Cervical back: Neck supple. Right lower leg: No edema. Left lower leg: No edema. Skin:     General: Skin is warm and dry.       Capillary Refill: Capillary refill takes less than 2 seconds. Coloration: Skin is pale. Neurological:      General: No focal deficit present. Mental Status: She is alert and oriented to person, place, and time. Psychiatric:         Mood and Affect: Mood normal.         Behavior: Behavior normal.       Assessment:        Hospital Problems             Last Modified POA    * (Principal) COPD exacerbation ([facility] [address] 11/3/2022 Yes    Decompensated heart failure ([facility] [address] 11/3/2022 Yes    Acute respiratory failure with hypoxia ([facility] [address] 11/3/2022 Yes       Plan:        Acute on chronic COPD exacerbation. Solumedrol changed to prednisone taper, continue IV antibiotis, try to wean down oxygen as tolerating, incentive spirometry  Concern for decompensated heart failure, follow up Echo  Discussed with patient, if able to be weaned down to home 4L nsasal cannula will plan to have patient follow up with pulmonology and cardiology as outpatient.      Chantal Richard MD  11/4/2022  9:48 AM

## 2022-11-05 ENCOUNTER — APPOINTMENT (OUTPATIENT)
Dept: CT IMAGING | Age: 67
DRG: 190 | End: 2022-11-05
Attending: STUDENT IN AN ORGANIZED HEALTH CARE EDUCATION/TRAINING PROGRAM
Payer: MEDICARE

## 2022-11-05 LAB
ALBUMIN SERPL-MCNC: 3.7 G/DL (ref 3.5–5.2)
ANION GAP SERPL CALCULATED.3IONS-SCNC: 9 MMOL/L (ref 9–17)
BUN BLDV-MCNC: 17 MG/DL (ref 8–23)
CALCIUM SERPL-MCNC: 9 MG/DL (ref 8.6–10.4)
CHLORIDE BLD-SCNC: 100 MMOL/L (ref 98–107)
CO2: 29 MMOL/L (ref 20–31)
CREAT SERPL-MCNC: 0.4 MG/DL (ref 0.5–0.9)
GFR SERPL CREATININE-BSD FRML MDRD: >60 ML/MIN/1.73M2
GLUCOSE BLD-MCNC: 86 MG/DL (ref 70–99)
HCT VFR BLD CALC: 41.2 % (ref 36.3–47.1)
HEMOGLOBIN: 12.8 G/DL (ref 11.9–15.1)
MAGNESIUM: 2.2 MG/DL (ref 1.6–2.6)
MCH RBC QN AUTO: 27.7 PG (ref 25.2–33.5)
MCHC RBC AUTO-ENTMCNC: 31.1 G/DL (ref 28.4–34.8)
MCV RBC AUTO: 89.2 FL (ref 82.6–102.9)
NRBC AUTOMATED: 0 PER 100 WBC
PDW BLD-RTO: 14.1 % (ref 11.8–14.4)
PHOSPHORUS: 2.5 MG/DL (ref 2.6–4.5)
PLATELET # BLD: 236 K/UL (ref 138–453)
PMV BLD AUTO: 10.2 FL (ref 8.1–13.5)
POTASSIUM SERPL-SCNC: 4.9 MMOL/L (ref 3.7–5.3)
PRO-BNP: 1413 PG/ML
RBC # BLD: 4.62 M/UL (ref 3.95–5.11)
SODIUM BLD-SCNC: 138 MMOL/L (ref 135–144)
WBC # BLD: 12.8 K/UL (ref 3.5–11.3)

## 2022-11-05 PROCEDURE — 83880 ASSAY OF NATRIURETIC PEPTIDE: CPT

## 2022-11-05 PROCEDURE — 6360000002 HC RX W HCPCS: Performed by: INTERNAL MEDICINE

## 2022-11-05 PROCEDURE — 6360000002 HC RX W HCPCS: Performed by: STUDENT IN AN ORGANIZED HEALTH CARE EDUCATION/TRAINING PROGRAM

## 2022-11-05 PROCEDURE — 2500000003 HC RX 250 WO HCPCS: Performed by: INTERNAL MEDICINE

## 2022-11-05 PROCEDURE — 6370000000 HC RX 637 (ALT 250 FOR IP): Performed by: NURSE PRACTITIONER

## 2022-11-05 PROCEDURE — 85027 COMPLETE CBC AUTOMATED: CPT

## 2022-11-05 PROCEDURE — 94761 N-INVAS EAR/PLS OXIMETRY MLT: CPT

## 2022-11-05 PROCEDURE — 71250 CT THORAX DX C-: CPT

## 2022-11-05 PROCEDURE — 36415 COLL VENOUS BLD VENIPUNCTURE: CPT

## 2022-11-05 PROCEDURE — 6370000000 HC RX 637 (ALT 250 FOR IP): Performed by: INTERNAL MEDICINE

## 2022-11-05 PROCEDURE — 99232 SBSQ HOSP IP/OBS MODERATE 35: CPT | Performed by: STUDENT IN AN ORGANIZED HEALTH CARE EDUCATION/TRAINING PROGRAM

## 2022-11-05 PROCEDURE — 83735 ASSAY OF MAGNESIUM: CPT

## 2022-11-05 PROCEDURE — 94640 AIRWAY INHALATION TREATMENT: CPT

## 2022-11-05 PROCEDURE — 6360000002 HC RX W HCPCS: Performed by: NURSE PRACTITIONER

## 2022-11-05 PROCEDURE — 6370000000 HC RX 637 (ALT 250 FOR IP): Performed by: STUDENT IN AN ORGANIZED HEALTH CARE EDUCATION/TRAINING PROGRAM

## 2022-11-05 PROCEDURE — 80069 RENAL FUNCTION PANEL: CPT

## 2022-11-05 PROCEDURE — 2700000000 HC OXYGEN THERAPY PER DAY

## 2022-11-05 PROCEDURE — 2060000000 HC ICU INTERMEDIATE R&B

## 2022-11-05 PROCEDURE — 2580000003 HC RX 258: Performed by: NURSE PRACTITIONER

## 2022-11-05 RX ORDER — FUROSEMIDE 10 MG/ML
40 INJECTION INTRAMUSCULAR; INTRAVENOUS DAILY
Status: DISCONTINUED | OUTPATIENT
Start: 2022-11-06 | End: 2022-11-06

## 2022-11-05 RX ORDER — FUROSEMIDE 10 MG/ML
80 INJECTION INTRAMUSCULAR; INTRAVENOUS DAILY
Status: DISCONTINUED | OUTPATIENT
Start: 2022-11-05 | End: 2022-11-05

## 2022-11-05 RX ADMIN — SODIUM CHLORIDE, PRESERVATIVE FREE 10 ML: 5 INJECTION INTRAVENOUS at 08:35

## 2022-11-05 RX ADMIN — GABAPENTIN 400 MG: 400 CAPSULE ORAL at 20:18

## 2022-11-05 RX ADMIN — DIBASIC SODIUM PHOSPHATE, MONOBASIC POTASSIUM PHOSPHATE AND MONOBASIC SODIUM PHOSPHATE 2 TABLET: 852; 155; 130 TABLET ORAL at 11:37

## 2022-11-05 RX ADMIN — LISINOPRIL 5 MG: 5 TABLET ORAL at 08:35

## 2022-11-05 RX ADMIN — PREDNISONE 40 MG: 20 TABLET ORAL at 08:35

## 2022-11-05 RX ADMIN — GABAPENTIN 400 MG: 400 CAPSULE ORAL at 08:35

## 2022-11-05 RX ADMIN — CEFEPIME 2000 MG: 2 INJECTION, POWDER, FOR SOLUTION INTRAVENOUS at 23:25

## 2022-11-05 RX ADMIN — HYDROCODONE BITARTRATE AND ACETAMINOPHEN 1 TABLET: 5; 325 TABLET ORAL at 08:36

## 2022-11-05 RX ADMIN — FUROSEMIDE 40 MG: 10 INJECTION, SOLUTION INTRAMUSCULAR; INTRAVENOUS at 09:07

## 2022-11-05 RX ADMIN — PANTOPRAZOLE SODIUM 40 MG: 40 TABLET, DELAYED RELEASE ORAL at 08:35

## 2022-11-05 RX ADMIN — FLUTICASONE PROPIONATE 2 SPRAY: 50 SPRAY, METERED NASAL at 08:35

## 2022-11-05 RX ADMIN — CEFEPIME 2000 MG: 2 INJECTION, POWDER, FOR SOLUTION INTRAVENOUS at 00:04

## 2022-11-05 RX ADMIN — BUDESONIDE AND FORMOTEROL FUMARATE DIHYDRATE 2 PUFF: 160; 4.5 AEROSOL RESPIRATORY (INHALATION) at 08:59

## 2022-11-05 RX ADMIN — SERTRALINE 100 MG: 50 TABLET, FILM COATED ORAL at 08:35

## 2022-11-05 RX ADMIN — IPRATROPIUM BROMIDE AND ALBUTEROL SULFATE 1 AMPULE: .5; 2.5 SOLUTION RESPIRATORY (INHALATION) at 15:45

## 2022-11-05 RX ADMIN — CEFEPIME 2000 MG: 2 INJECTION, POWDER, FOR SOLUTION INTRAVENOUS at 11:37

## 2022-11-05 RX ADMIN — HYDROCODONE BITARTRATE AND ACETAMINOPHEN 1 TABLET: 5; 325 TABLET ORAL at 16:45

## 2022-11-05 RX ADMIN — IPRATROPIUM BROMIDE AND ALBUTEROL SULFATE 1 AMPULE: .5; 2.5 SOLUTION RESPIRATORY (INHALATION) at 11:53

## 2022-11-05 RX ADMIN — Medication 81 MG: at 08:35

## 2022-11-05 RX ADMIN — ENOXAPARIN SODIUM 40 MG: 100 INJECTION SUBCUTANEOUS at 08:35

## 2022-11-05 RX ADMIN — IPRATROPIUM BROMIDE AND ALBUTEROL SULFATE 1 AMPULE: .5; 2.5 SOLUTION RESPIRATORY (INHALATION) at 19:56

## 2022-11-05 RX ADMIN — METOPROLOL TARTRATE 25 MG: 25 TABLET ORAL at 08:35

## 2022-11-05 RX ADMIN — IPRATROPIUM BROMIDE AND ALBUTEROL SULFATE 1 AMPULE: .5; 2.5 SOLUTION RESPIRATORY (INHALATION) at 08:59

## 2022-11-05 RX ADMIN — GABAPENTIN 400 MG: 400 CAPSULE ORAL at 14:54

## 2022-11-05 RX ADMIN — HYDROCODONE BITARTRATE AND ACETAMINOPHEN 1 TABLET: 5; 325 TABLET ORAL at 23:24

## 2022-11-05 RX ADMIN — POTASSIUM BICARBONATE 40 MEQ: 782 TABLET, EFFERVESCENT ORAL at 08:35

## 2022-11-05 RX ADMIN — BUDESONIDE AND FORMOTEROL FUMARATE DIHYDRATE 2 PUFF: 160; 4.5 AEROSOL RESPIRATORY (INHALATION) at 19:57

## 2022-11-05 RX ADMIN — SODIUM CHLORIDE, PRESERVATIVE FREE 10 ML: 5 INJECTION INTRAVENOUS at 20:19

## 2022-11-05 ASSESSMENT — ENCOUNTER SYMPTOMS
APNEA: 0
RHINORRHEA: 1
CHOKING: 0
ABDOMINAL PAIN: 0
DIARRHEA: 0
COLOR CHANGE: 0
EYE ITCHING: 0
SINUS PRESSURE: 1
EYE DISCHARGE: 0
FACIAL SWELLING: 0
SHORTNESS OF BREATH: 1
COUGH: 0
CONSTIPATION: 0
CHEST TIGHTNESS: 0
ABDOMINAL DISTENTION: 0
BACK PAIN: 0

## 2022-11-05 ASSESSMENT — PAIN DESCRIPTION - LOCATION
LOCATION: BACK
LOCATION: BACK

## 2022-11-05 ASSESSMENT — PAIN DESCRIPTION - DESCRIPTORS
DESCRIPTORS: DISCOMFORT;ACHING
DESCRIPTORS: ACHING;DISCOMFORT

## 2022-11-05 ASSESSMENT — PAIN SCALES - GENERAL
PAINLEVEL_OUTOF10: 6
PAINLEVEL_OUTOF10: 6
PAINLEVEL_OUTOF10: 7

## 2022-11-05 ASSESSMENT — PAIN - FUNCTIONAL ASSESSMENT
PAIN_FUNCTIONAL_ASSESSMENT: PREVENTS OR INTERFERES SOME ACTIVE ACTIVITIES AND ADLS
PAIN_FUNCTIONAL_ASSESSMENT: PREVENTS OR INTERFERES SOME ACTIVE ACTIVITIES AND ADLS

## 2022-11-05 NOTE — PLAN OF CARE
Problem: Discharge Planning  Goal: Discharge to home or other facility with appropriate resources  11/4/2022 2335 by Kelly Ortega RN  Outcome: Progressing  11/4/2022 1810 by Arcelia Michele  Outcome: Progressing     Problem: Pain  Goal: Verbalizes/displays adequate comfort level or baseline comfort level  11/4/2022 2335 by Kelly Ortega RN  Outcome: Progressing  11/4/2022 1810 by Arcelia Michele  Outcome: Progressing     Problem: Safety - Adult  Goal: Free from fall injury  11/4/2022 2335 by Kelly Ortega RN  Outcome: Progressing  11/4/2022 1810 by Arcelia Michele  Outcome: Progressing     Problem: ABCDS Injury Assessment  Goal: Absence of physical injury  11/4/2022 2335 by Kelly Ortega RN  Outcome: Progressing  11/4/2022 1810 by Arcelia Michele  Outcome: Progressing     Problem: Respiratory - Adult  Goal: Achieves optimal ventilation and oxygenation  11/4/2022 2335 by Kelly Ortega RN  Outcome: Progressing  11/4/2022 1810 by Arcelia Michele  Outcome: Progressing

## 2022-11-05 NOTE — DISCHARGE INSTRUCTIONS
Heart Failure: Care Instructions  Your Care Instructions     Heart failure occurs when your heart does not pump as much blood as the body needs. Failure does not mean that the heart has stopped pumping but rather that it is not pumping as well as it should. Over time, this causes fluid buildup in your lungs and other parts of your body. Fluid buildup can cause shortness of breath, fatigue, swollen ankles, and other problems. By taking medicines regularly, reducing sodium (salt) in your diet, checking your weight every day, and making lifestyle changes, you can feel better and live longer. Follow-up care is a key part of your treatment and safety. Be sure to make and go to all appointments, and call your doctor if you are having problems. It's also a good idea to know your test results and keep a list of the medicines you take. How can you care for yourself at home? Medicines    Be safe with medicines. Take your medicines exactly as prescribed. Call your doctor if you think you are having a problem with your medicine. Do not take any vitamins, over-the-counter medicine, or herbal products without talking to your doctor first. Adela Juan not take ibuprofen (Advil or Motrin) and naproxen (Aleve) without talking to your doctor first. They could make your heart failure worse. You may take some of the following medicine. Angiotensin-converting enzyme inhibitors (ACEIs) or angiotensin II receptor blockers (ARBs) reduce the heart's workload, lower blood pressure, and reduce swelling. Taking an ACEI or ARB may lower your chance of needing to be hospitalized. Beta-blockers can slow heart rate, decrease blood pressure, and improve your condition. Taking a beta-blocker may lower your chance of needing to be hospitalized. Diuretics, also called water pills, reduce swelling. You will get more details on the specific medicines your doctor prescribes. Diet    Your doctor may suggest that you limit sodium.  Your doctor can tell you how much sodium is right for you. An example is less than 3,000 mg a day. This includes all the salt you eat in cooking or in packaged foods. People get most of their sodium from processed foods. Fast food and restaurant meals also tend to be very high in sodium. Ask your doctor how much liquid you can drink each day. You may have to limit liquids. Weight    Weigh yourself without clothing at the same time each day. Record your weight. Call your doctor if you have a sudden weight gain, such as more than 2 to 3 pounds in a day or 5 pounds in a week. (Your doctor may suggest a different range of weight gain.) A sudden weight gain may mean that your heart failure is getting worse. Activity level    Start light exercise (if your doctor says it is okay). Even if you can only do a small amount, exercise will help you get stronger, have more energy, and manage your weight and your stress. Walking is an easy way to get exercise. Start out by walking a little more than you did before. Bit by bit, increase the amount you walk. When you exercise, watch for signs that your heart is working too hard. You are pushing yourself too hard if you cannot talk while you are exercising. If you become short of breath or dizzy or have chest pain, stop, sit down, and rest.     If you feel \"wiped out\" the day after you exercise, walk slower or for a shorter distance until you can work up to a better pace. Get enough rest at night. Sleeping with 1 or 2 pillows under your upper body and head may help you breathe easier. Lifestyle changes    Do not smoke. Smoking can make a heart condition worse. If you need help quitting, talk to your doctor about stop-smoking programs and medicines. These can increase your chances of quitting for good. Quitting smoking may be the most important step you can take to protect your heart. Limit alcohol to 2 drinks a day for men and 1 drink a day for women.  Too much alcohol can cause health problems. Avoid getting sick from colds and the flu. Get a pneumococcal vaccine shot. If you have had one before, ask your doctor whether you need another dose. Get a flu shot each year. If you must be around people with colds or the flu, wash your hands often. When should you call for help? Call 911  if you have symptoms of sudden heart failure such as: You have severe trouble breathing. You cough up pink, foamy mucus. You have a new irregular or rapid heartbeat. Call your doctor now or seek immediate medical care if:    You have new or increased shortness of breath. You are dizzy or lightheaded, or you feel like you may faint. You have sudden weight gain, such as more than 2 to 3 pounds in a day or 5 pounds in a week. (Your doctor may suggest a different range of weight gain.)     You have increased swelling in your legs, ankles, or feet. You are suddenly so tired or weak that you cannot do your usual activities. Watch closely for changes in your health, and be sure to contact your doctor if you develop new symptoms. Where can you learn more? Go to https://[employer] News.iMega. org and sign in to your Bump[employer] account. Enter T041 in the [facility] box to learn more about \"Heart Failure: Care Instructions. \"     If you do not have an account, please click on the \"Sign Up Now\" link. Current as of: January 10, 2022               Content Version: 13.4  © 2006-2022 Healthwise, [facility]. Care instructions adapted under license by [facility] ([facility]). If you have questions about a medical condition or this instruction, always ask your healthcare professional. [place] [address] any warranty or liability for your use of this information.

## 2022-11-05 NOTE — PROGRESS NOTES
[facility]  Office: [address], DO, Jose , DO, Maria Esther Sender, DO, Anmol December Blood, DO, Anjum Morse MD, Teetee Whatley MD, Josef Trevino MD, Raegan Remy MD,  Shaheed Barriga MD, Satish Nails MD, Sara Gaines, DO, Melene Boast, MD,  Val Israel MD, Maddie Chavez MD, Lamonte Hamilton, DO, Wesley Bartlett MD, Henna Whipple MD, Catie Bauer, DO, Mahendra Greene MD, Norm Fontaine MD, Akhil Bello MD, Glory Dutton MD, Fernand Dance, DO, Aditya Pardo MD, Tiburcio Alan MD, Chidi Fernandez, CNP,  Thad Ross, CNP, Rick Quezada, CNP, Manju Null, CNP,  Casey Shanks, [facility], Devendra Regan, CNP, Stanley Gonzalez, CNP, Lamberto Crain, CNP, Danie Bejarano, CNP, Uzair Mulligan, CNP, Kaleb Gates PA-C, Christine Dueñas, CNS, Teresa Wakefield, [facility], Ayanna Arias, CNP, Ting Neil, CNP, Aakash Zepeda, CNP         [address]    Progress Note    11/5/2022    9:46 AM    Name:   Ce Gage  MRN:     [id number]     [place]:      [de-identified]   Room:   [address] [place] [address] Day:  2  Admit Date:  11/3/2022 10:43 AM    PCP:   Lexie Garcia  Code Status:  DNR-CCA    Subjective:     C/C: shortness of breath  Interval History Status: improved. Patient seen and examined. On high flow, doing ok, no other issues. Brief History:     75year old female with history of COPD chronically on 4L nasal cannula, presented to the ER with 3-4 of shortness of breath, patient required non re breather and high flow nasal cannula and has improved. Patient weaning down on high flow nasal cannula. Echo ordered due to selam for possible mixed COPD/CHF picture because respiratory effort has improed with aggressive diuresis. Echo:  Summary  Left ventricle is normal in size. Global left ventricular systolic function  is normal. Calculated ejection fraction 52% by Mahoney's method.   Grade II (moderate) left ventricular diastolic dysfunction. Mildly dilated right ventricular cavity. Normal right ventricular function. Trivial tricuspid regurgitation. Mild pulmonary hypertension. Estimated right ventricular systolic pressure  is [id number]. Review of Systems:     Review of Systems   Constitutional:  Negative for activity change, appetite change, chills and fever. HENT:  Positive for rhinorrhea and sinus pressure. Negative for congestion, ear pain, facial swelling and mouth sores. Eyes:  Negative for discharge and itching. Respiratory:  Positive for shortness of breath. Negative for apnea, cough, choking and chest tightness. Cardiovascular:  Negative for chest pain and leg swelling. Gastrointestinal:  Negative for abdominal distention, abdominal pain, constipation and diarrhea. Endocrine: Negative for cold intolerance, polyphagia and polyuria. Genitourinary:  Negative for difficulty urinating, dysuria and flank pain. Musculoskeletal:  Negative for arthralgias, back pain and joint swelling. Skin:  Negative for color change and wound. Neurological:  Negative for dizziness, seizures, light-headedness and headaches. Psychiatric/Behavioral:  Negative for agitation, behavioral problems and self-injury. Medications: Allergies: Allergies   Allergen Reactions    Augmentin [Amoxicillin-Pot Clavulanate] Hives    Iv Dye [Iodides]     Talwin [Pentazocine]      Migraine.        Current Meds:   Scheduled Meds:    furosemide  80 mg IntraVENous Daily    fluticasone  2 spray Each Nostril Daily    predniSONE  40 mg Oral Daily    Followed by    Ramsey Roche ON 11/7/2022] predniSONE  30 mg Oral Daily    Followed by    Ramsey Roche ON 11/10/2022] predniSONE  20 mg Oral Daily    Followed by    Ramsey Roche ON 11/13/2022] predniSONE  10 mg Oral Daily    sodium chloride flush  5-40 mL IntraVENous 2 times per day    enoxaparin  40 mg SubCUTAneous Daily    lisinopril  5 mg Oral Daily    ipratropium-albuterol  1 ampule Inhalation Q4H WA    cefepime  2,000 mg IntraVENous Q12H    aspirin  81 mg Oral Daily    budesonide-formoterol  2 puff Inhalation BID    gabapentin  400 mg Oral TID    metoprolol tartrate  25 mg Oral BID    pantoprazole  40 mg Oral QAM AC    sertraline  100 mg Oral Daily    potassium bicarb-citric acid  40 mEq Oral BID     Continuous Infusions:    sodium chloride       PRN Meds: sodium chloride, sodium chloride flush, sodium chloride, ondansetron **OR** ondansetron, polyethylene glycol, acetaminophen **OR** acetaminophen, albuterol, HYDROcodone 5 mg - acetaminophen    Data:     Past Medical History:   has a past medical history of Acid indigestion, Chest pain, Chronic back pain, Chronic obstructive lung disease ([address] Coronary atherosclerosis, Depression, Disseminated idiopathic skeletal hyperostosis, Fatigue, Hard of hearing, Hip pain, Hyperlipidemia, Hypertension, Old myocardial infarction, and Seasonal allergies. Social History:   reports that she has quit smoking. Her smoking use included cigarettes. She has never used smokeless tobacco. She reports that she does not drink alcohol and does not use drugs. Family History:   Family History   Family history unknown: Yes       Vitals:  /86   Pulse 74   Temp 98.8 °F (37.1 °C) (Oral)   Resp 17   Ht 5' 3\" (1.6 m)   Wt 173 lb 1 oz (78.5 kg)   SpO2 96%   BMI 30.66 kg/m²   Temp (24hrs), Av.6 °F (37 °C), Min:98.1 °F (36.7 °C), Max:98.8 °F (37.1 °C)    No results for input(s): POCGLU in the last 72 hours. I/O (24Hr):     Intake/Output Summary (Last 24 hours) at 2022 0946  Last data filed at 2022 0600  Gross per 24 hour   Intake --   Output 650 ml   Net -650 ml         Labs:  Hematology:  Recent Labs     22  1143 22  0623 22  0829   WBC 13.3* 14.3* 12.8*   RBC 4.45 4.33 4.62   HGB 12.3 12.1 12.8   HCT 38.2 37.8 41.2   MCV 85.8 87.3 89.2   MCH 27.6 27.9 27.7   MCHC 32.2 32.0 31.1   RDW 13.6 14.0 14.1    275 236   MPV 10.1 9.9 10.2       Chemistry:  Recent Labs     11/03/22  1143 11/03/22  1436 11/04/22  0623 11/05/22  0829     --  141 138   K 3.1*  --  4.7 4.9     --  104 100   CO2 27  --  28 29   GLUCOSE 140*  --  138* 86   BUN 13  --  18 17   CREATININE 0.46*  --  0.36* 0.40*   MG 1.7  --  2.6 2.2   ANIONGAP 14  --  9 9   LABGLOM >60  --  >60 >60   CALCIUM 9.6  --  9.0 9.0   PHOS 2.9  --  2.7 2.5*   TROPHS 8 10  --   --        Recent Labs     11/03/22  1143 11/04/22  0623 11/05/22  0829   LABALBU 4.0 3.9 3.7   TSH 0.37  --   --    CHOL  --  132  --    HDL  --  39*  --    LDLCHOLESTEROL  --  68  --    CHOLHDLRATIO  --  3.4  --    TRIG  --  125  --        ABG:No results found for: POCPH, PHART, PH, POCPCO2, MRO2UEQ, PCO2, POCPO2, PO2ART, PO2, POCHCO3, DBD8BJH, HCO3, NBEA, PBEA, BEART, BE, THGBART, THB, WWZ3GQB, ICED0FQC, R6WPPLXK, O2SAT, FIO2  No results found for: SPECIAL  No results found for: CULTURE    Radiology:  XR CHEST (SINGLE VIEW FRONTAL)    Result Date: 11/3/2022  Interstitial prominence and bilateral pulmonary opacities, concerning for edema or pneumonia. Cardiomegaly. Physical Examination:        Physical Exam  Vitals reviewed. Constitutional:       General: She is not in acute distress. Appearance: She is ill-appearing. Interventions: She is not intubated. HENT:      Head: Normocephalic and atraumatic. Right Ear: External ear normal.      Left Ear: External ear normal.      Nose: Congestion and rhinorrhea present. Comments: High flow nasal cannula in place     Mouth/Throat:      Mouth: Mucous membranes are moist.   Eyes:      Extraocular Movements: Extraocular movements intact. Pupils: Pupils are equal, round, and reactive to light. Cardiovascular:      Rate and Rhythm: Normal rate and regular rhythm. Pulses: Normal pulses. Heart sounds: No murmur heard. Pulmonary:      Effort: Tachypnea and prolonged expiration present. No respiratory distress.  She is not intubated. Breath sounds: Decreased air movement present. Examination of the right-upper field reveals decreased breath sounds. Examination of the left-upper field reveals decreased breath sounds. Examination of the right-middle field reveals decreased breath sounds. Examination of the right-lower field reveals decreased breath sounds. Examination of the left-lower field reveals decreased breath sounds. Decreased breath sounds present. Abdominal:      General: There is no distension. Palpations: Abdomen is soft. Musculoskeletal:      Cervical back: Neck supple. Right lower leg: No edema. Left lower leg: No edema. Skin:     General: Skin is warm and dry. Capillary Refill: Capillary refill takes less than 2 seconds. Coloration: Skin is pale. Neurological:      General: No focal deficit present. Mental Status: She is alert and oriented to person, place, and time. Psychiatric:         Mood and Affect: Mood normal.         Behavior: Behavior normal.       Assessment:        Hospital Problems             Last Modified POA    * (Principal) COPD exacerbation ([address] 11/3/2022 Yes    Decompensated heart failure ([address] 11/3/2022 Yes    Acute respiratory failure with hypoxia ([address] 11/3/2022 Yes     Plan:        Acute on chronic COPD exacerbation.   prednisone taper, continue IV antibiotis, try to wean down oxygen as tolerating, incentive spirometry, acapella  Echo revied and discussed with patient, continue lopressor, asa, statin, lisinopril  1 dose IV lasix  Follow up CT chest  PT/OT    Reynaldo Aguilar MD  11/5/2022  9:46 AM

## 2022-11-06 LAB
ALBUMIN SERPL-MCNC: 3.7 G/DL (ref 3.5–5.2)
ANION GAP SERPL CALCULATED.3IONS-SCNC: 9 MMOL/L (ref 9–17)
BUN BLDV-MCNC: 24 MG/DL (ref 8–23)
CALCIUM SERPL-MCNC: 9.6 MG/DL (ref 8.6–10.4)
CHLORIDE BLD-SCNC: 98 MMOL/L (ref 98–107)
CO2: 30 MMOL/L (ref 20–31)
CREAT SERPL-MCNC: 0.43 MG/DL (ref 0.5–0.9)
GFR SERPL CREATININE-BSD FRML MDRD: >60 ML/MIN/1.73M2
GLUCOSE BLD-MCNC: 85 MG/DL (ref 70–99)
HCT VFR BLD CALC: 38 % (ref 36.3–47.1)
HEMOGLOBIN: 12 G/DL (ref 11.9–15.1)
MAGNESIUM: 2.3 MG/DL (ref 1.6–2.6)
MCH RBC QN AUTO: 27.7 PG (ref 25.2–33.5)
MCHC RBC AUTO-ENTMCNC: 31.6 G/DL (ref 28.4–34.8)
MCV RBC AUTO: 87.8 FL (ref 82.6–102.9)
NRBC AUTOMATED: 0 PER 100 WBC
PDW BLD-RTO: 14 % (ref 11.8–14.4)
PHOSPHORUS: 3.7 MG/DL (ref 2.6–4.5)
PLATELET # BLD: 229 K/UL (ref 138–453)
PMV BLD AUTO: 10.2 FL (ref 8.1–13.5)
POTASSIUM SERPL-SCNC: 4.8 MMOL/L (ref 3.7–5.3)
RBC # BLD: 4.33 M/UL (ref 3.95–5.11)
SODIUM BLD-SCNC: 137 MMOL/L (ref 135–144)
WBC # BLD: 10.4 K/UL (ref 3.5–11.3)

## 2022-11-06 PROCEDURE — 85027 COMPLETE CBC AUTOMATED: CPT

## 2022-11-06 PROCEDURE — 94761 N-INVAS EAR/PLS OXIMETRY MLT: CPT

## 2022-11-06 PROCEDURE — 6370000000 HC RX 637 (ALT 250 FOR IP): Performed by: STUDENT IN AN ORGANIZED HEALTH CARE EDUCATION/TRAINING PROGRAM

## 2022-11-06 PROCEDURE — 6360000002 HC RX W HCPCS: Performed by: STUDENT IN AN ORGANIZED HEALTH CARE EDUCATION/TRAINING PROGRAM

## 2022-11-06 PROCEDURE — 99232 SBSQ HOSP IP/OBS MODERATE 35: CPT | Performed by: STUDENT IN AN ORGANIZED HEALTH CARE EDUCATION/TRAINING PROGRAM

## 2022-11-06 PROCEDURE — 6370000000 HC RX 637 (ALT 250 FOR IP): Performed by: INTERNAL MEDICINE

## 2022-11-06 PROCEDURE — 2060000000 HC ICU INTERMEDIATE R&B

## 2022-11-06 PROCEDURE — 6370000000 HC RX 637 (ALT 250 FOR IP): Performed by: NURSE PRACTITIONER

## 2022-11-06 PROCEDURE — 83735 ASSAY OF MAGNESIUM: CPT

## 2022-11-06 PROCEDURE — 6360000002 HC RX W HCPCS: Performed by: NURSE PRACTITIONER

## 2022-11-06 PROCEDURE — 2700000000 HC OXYGEN THERAPY PER DAY

## 2022-11-06 PROCEDURE — 6360000002 HC RX W HCPCS: Performed by: INTERNAL MEDICINE

## 2022-11-06 PROCEDURE — 2580000003 HC RX 258: Performed by: NURSE PRACTITIONER

## 2022-11-06 PROCEDURE — 94640 AIRWAY INHALATION TREATMENT: CPT

## 2022-11-06 PROCEDURE — 80069 RENAL FUNCTION PANEL: CPT

## 2022-11-06 PROCEDURE — 2500000003 HC RX 250 WO HCPCS: Performed by: INTERNAL MEDICINE

## 2022-11-06 RX ORDER — CYCLOBENZAPRINE HCL 10 MG
10 TABLET ORAL 3 TIMES DAILY PRN
Status: DISCONTINUED | OUTPATIENT
Start: 2022-11-06 | End: 2022-11-07 | Stop reason: HOSPADM

## 2022-11-06 RX ORDER — LISINOPRIL 5 MG/1
5 TABLET ORAL DAILY
Qty: 30 TABLET | Refills: 0 | Status: SHIPPED | OUTPATIENT
Start: 2022-11-07 | End: 2022-12-07

## 2022-11-06 RX ORDER — DOXYCYCLINE HYCLATE 100 MG
100 TABLET ORAL 2 TIMES DAILY
Qty: 10 TABLET | Refills: 0 | Status: SHIPPED | OUTPATIENT
Start: 2022-11-06 | End: 2022-11-11

## 2022-11-06 RX ORDER — CYCLOBENZAPRINE HCL 10 MG
10 TABLET ORAL 3 TIMES DAILY PRN
Qty: 30 TABLET | Refills: 0 | Status: SHIPPED | OUTPATIENT
Start: 2022-11-06 | End: 2022-11-16

## 2022-11-06 RX ORDER — PREDNISONE 10 MG/1
TABLET ORAL
Qty: 18 TABLET | Refills: 0 | Status: SHIPPED | OUTPATIENT
Start: 2022-11-06 | End: 2022-11-15

## 2022-11-06 RX ADMIN — METOPROLOL TARTRATE 25 MG: 25 TABLET ORAL at 20:07

## 2022-11-06 RX ADMIN — IPRATROPIUM BROMIDE AND ALBUTEROL SULFATE 1 AMPULE: .5; 2.5 SOLUTION RESPIRATORY (INHALATION) at 20:56

## 2022-11-06 RX ADMIN — SODIUM CHLORIDE, PRESERVATIVE FREE 10 ML: 5 INJECTION INTRAVENOUS at 20:08

## 2022-11-06 RX ADMIN — PANTOPRAZOLE SODIUM 40 MG: 40 TABLET, DELAYED RELEASE ORAL at 09:33

## 2022-11-06 RX ADMIN — PREDNISONE 40 MG: 20 TABLET ORAL at 09:33

## 2022-11-06 RX ADMIN — CYCLOBENZAPRINE 10 MG: 10 TABLET, FILM COATED ORAL at 20:07

## 2022-11-06 RX ADMIN — HYDROCODONE BITARTRATE AND ACETAMINOPHEN 1 TABLET: 5; 325 TABLET ORAL at 09:35

## 2022-11-06 RX ADMIN — BUDESONIDE AND FORMOTEROL FUMARATE DIHYDRATE 2 PUFF: 160; 4.5 AEROSOL RESPIRATORY (INHALATION) at 07:51

## 2022-11-06 RX ADMIN — IPRATROPIUM BROMIDE AND ALBUTEROL SULFATE 1 AMPULE: .5; 2.5 SOLUTION RESPIRATORY (INHALATION) at 11:39

## 2022-11-06 RX ADMIN — METOPROLOL TARTRATE 25 MG: 25 TABLET ORAL at 09:33

## 2022-11-06 RX ADMIN — CYCLOBENZAPRINE 10 MG: 10 TABLET, FILM COATED ORAL at 11:58

## 2022-11-06 RX ADMIN — LISINOPRIL 5 MG: 5 TABLET ORAL at 09:33

## 2022-11-06 RX ADMIN — FUROSEMIDE 40 MG: 10 INJECTION, SOLUTION INTRAMUSCULAR; INTRAVENOUS at 11:13

## 2022-11-06 RX ADMIN — SERTRALINE 100 MG: 50 TABLET, FILM COATED ORAL at 09:33

## 2022-11-06 RX ADMIN — GABAPENTIN 400 MG: 400 CAPSULE ORAL at 20:07

## 2022-11-06 RX ADMIN — HYDROCODONE BITARTRATE AND ACETAMINOPHEN 1 TABLET: 5; 325 TABLET ORAL at 22:11

## 2022-11-06 RX ADMIN — HYDROCODONE BITARTRATE AND ACETAMINOPHEN 1 TABLET: 5; 325 TABLET ORAL at 15:49

## 2022-11-06 RX ADMIN — ENOXAPARIN SODIUM 40 MG: 100 INJECTION SUBCUTANEOUS at 09:33

## 2022-11-06 RX ADMIN — BUDESONIDE AND FORMOTEROL FUMARATE DIHYDRATE 2 PUFF: 160; 4.5 AEROSOL RESPIRATORY (INHALATION) at 20:55

## 2022-11-06 RX ADMIN — HYDROCODONE BITARTRATE AND ACETAMINOPHEN 1 TABLET: 5; 325 TABLET ORAL at 04:31

## 2022-11-06 RX ADMIN — GABAPENTIN 400 MG: 400 CAPSULE ORAL at 14:39

## 2022-11-06 RX ADMIN — GABAPENTIN 400 MG: 400 CAPSULE ORAL at 09:33

## 2022-11-06 RX ADMIN — SODIUM CHLORIDE, PRESERVATIVE FREE 10 ML: 5 INJECTION INTRAVENOUS at 11:13

## 2022-11-06 RX ADMIN — FLUTICASONE PROPIONATE 2 SPRAY: 50 SPRAY, METERED NASAL at 09:38

## 2022-11-06 RX ADMIN — CEFEPIME 2000 MG: 2 INJECTION, POWDER, FOR SOLUTION INTRAVENOUS at 23:34

## 2022-11-06 RX ADMIN — IPRATROPIUM BROMIDE AND ALBUTEROL SULFATE 1 AMPULE: .5; 2.5 SOLUTION RESPIRATORY (INHALATION) at 15:23

## 2022-11-06 RX ADMIN — CEFEPIME 2000 MG: 2 INJECTION, POWDER, FOR SOLUTION INTRAVENOUS at 11:13

## 2022-11-06 RX ADMIN — Medication 81 MG: at 09:33

## 2022-11-06 ASSESSMENT — ENCOUNTER SYMPTOMS
SINUS PRESSURE: 0
APNEA: 0
SHORTNESS OF BREATH: 0
ABDOMINAL DISTENTION: 0
CHOKING: 0
BACK PAIN: 0
FACIAL SWELLING: 0
CONSTIPATION: 0
COUGH: 0
RHINORRHEA: 0
CHEST TIGHTNESS: 0
EYE ITCHING: 0
DIARRHEA: 0
COLOR CHANGE: 0
ABDOMINAL PAIN: 0
EYE DISCHARGE: 0

## 2022-11-06 ASSESSMENT — PAIN SCALES - GENERAL
PAINLEVEL_OUTOF10: 7
PAINLEVEL_OUTOF10: 10
PAINLEVEL_OUTOF10: 6
PAINLEVEL_OUTOF10: 8
PAINLEVEL_OUTOF10: 6
PAINLEVEL_OUTOF10: 7

## 2022-11-06 ASSESSMENT — PAIN DESCRIPTION - LOCATION: LOCATION: BACK

## 2022-11-06 NOTE — PLAN OF CARE
Problem: Discharge Planning  Goal: Discharge to home or other facility with appropriate resources  Outcome: Progressing     Problem: Pain  Goal: Verbalizes/displays adequate comfort level or baseline comfort level  Outcome: Progressing     Problem: Safety - Adult  Goal: Free from fall injury  Outcome: Progressing     Problem: ABCDS Injury Assessment  Goal: Absence of physical injury  Outcome: Progressing     Problem: Respiratory - Adult  Goal: Achieves optimal ventilation and oxygenation  11/6/2022 1618 by Hetal Haddad RN  Outcome: Progressing   Electronically signed by Cindy Craven RN on 11/6/2022 at 4:19 PM

## 2022-11-06 NOTE — PLAN OF CARE
Problem: Discharge Planning  Goal: Discharge to home or other facility with appropriate resources  11/5/2022 2107 by Kelly Ortega RN  Outcome: Progressing  11/5/2022 1731 by Sarita Shoemaker RN  Outcome: Progressing     Problem: Pain  Goal: Verbalizes/displays adequate comfort level or baseline comfort level  11/5/2022 2107 by Kelly Ortega RN  Outcome: Progressing  11/5/2022 1731 by Sarita Shoemaker RN  Outcome: Progressing     Problem: Safety - Adult  Goal: Free from fall injury  11/5/2022 2107 by Kelly Ortega RN  Outcome: Progressing  11/5/2022 1731 by Sarita Shoemaker RN  Outcome: Progressing     Problem: ABCDS Injury Assessment  Goal: Absence of physical injury  11/5/2022 2107 by Kelly Ortega RN  Outcome: Progressing  11/5/2022 1731 by Sarita Shoemaker RN  Outcome: Progressing     Problem: Respiratory - Adult  Goal: Achieves optimal ventilation and oxygenation  11/5/2022 2107 by Kelly Ortega RN  Outcome: Progressing  11/5/2022 2001 by Lindy Dodd RCP  Outcome: Progressing  11/5/2022 1731 by Sarita Shoemaker RN  Outcome: Progressing

## 2022-11-06 NOTE — PLAN OF CARE
Problem: Respiratory - Adult  Goal: Achieves optimal ventilation and oxygenation  11/5/2022 2001 by Elen Moore RCP  Outcome: Progressing  11/5/2022 1731 by Marge Salazar RN  Outcome: Progressing

## 2022-11-06 NOTE — PROGRESS NOTES
Rn talked to patient regarding d/c. Patient is on 6L of O2, her home concentrator does not go that high. Dr. Teri Do notified.    Electronically signed by Purnima Nicolas RN on 11/6/2022 at 2:47 PM

## 2022-11-06 NOTE — PROGRESS NOTES
[facility]  Office: [address], DO, Luke Alis, DO, Shae Settle, DO, Judith Aminjosie Blood, DO, Lupe Bergman MD, Johann Riedel, MD, Marino Fox MD, Pancho Murdock MD,  Vaishali Samayoa MD, Trina Ovalle MD, Shiloh Forbes, DO, Hilary Grant MD,  Renaldo Jameson MD, Marco Valdes MD, Alvina Washington, DO, Kyle Chaves MD, Wayne Echols MD, Mariela Albarran, DO, Awa Rey MD, Alla Preciado MD, Obey Krishnamurthy MD, Maynor Newberry MD, Analia Villalobos, DO, Juliana Falcon MD, Dianelys Bermudez MD, Iman Roblero, [facility],  Lucrecia Soni, CNP, Maria R Rhodes, CNP, Rocky Rollins, CNP,  Grecia Horton, [facility], Melquiades Sanabria, CNP, Benny Wen, CNP, Ursula Mitchell, CNP, Jelani Rush, CNP, Kobe Ann, CNP, Zaid Reynolds PA-C, Areli Erickson, CNS, Sophy Patel, [facility], Merced Eason, CNP, Binta Valdez, CNP, Montana Valdez, CNP         [address]    Progress Note    11/6/2022    9:34 AM    Name:   Galo Oliva  MRN:     [id number]     [place]:      [de-identified]   Room:   [address] [place] [address] Day:  3  Admit Date:  11/3/2022 10:43 AM    PCP:   Anderson Perez  Code Status:  DNR-CCA    Subjective:     C/C: shortness of breath  Interval History Status: improved. Patient seen and examined. On high flow, doing ok, no other issues. Brief History:     75year old female with history of COPD chronically on 4L nasal cannula, presented to the ER with 3-4 of shortness of breath, patient required non re breather and high flow nasal cannula and has improved. Patient weaning down on high flow nasal cannula. Echo ordered due to cocnern for possible mixed COPD/CHF picture because respiratory effort has improed with aggressive diuresis. Echo:  Summary  Left ventricle is normal in size. Global left ventricular systolic function  is normal. Calculated ejection fraction 52% by Mahoney's method.   Grade II (moderate) left ventricular diastolic dysfunction. Mildly dilated right ventricular cavity. Normal right ventricular function. Trivial tricuspid regurgitation. Mild pulmonary hypertension. Estimated right ventricular systolic pressure  is 82LCXG. Review of Systems:     Review of Systems   Constitutional:  Negative for activity change, appetite change, chills and fever. HENT:  Negative for congestion, ear pain, facial swelling, mouth sores, rhinorrhea and sinus pressure. Eyes:  Negative for discharge and itching. Respiratory:  Negative for apnea, cough, choking, chest tightness and shortness of breath. Cardiovascular:  Negative for chest pain and leg swelling. Gastrointestinal:  Negative for abdominal distention, abdominal pain, constipation and diarrhea. Endocrine: Negative for cold intolerance, polyphagia and polyuria. Genitourinary:  Negative for difficulty urinating, dysuria and flank pain. Musculoskeletal:  Negative for arthralgias, back pain and joint swelling. Skin:  Negative for color change and wound. Neurological:  Negative for dizziness, seizures, light-headedness and headaches. Psychiatric/Behavioral:  Negative for agitation, behavioral problems and self-injury. Medications: Allergies: Allergies   Allergen Reactions    Augmentin [Amoxicillin-Pot Clavulanate] Hives    Iv Dye [Iodides]     Talwin [Pentazocine]      Migraine.        Current Meds:   Scheduled Meds:    furosemide  40 mg IntraVENous Daily    fluticasone  2 spray Each Nostril Daily    predniSONE  40 mg Oral Daily    Followed by    Dolly Lewis ON 11/7/2022] predniSONE  30 mg Oral Daily    Followed by    Dolly Lewis ON 11/10/2022] predniSONE  20 mg Oral Daily    Followed by    Dolly Lewis ON 11/13/2022] predniSONE  10 mg Oral Daily    sodium chloride flush  5-40 mL IntraVENous 2 times per day    enoxaparin  40 mg SubCUTAneous Daily    lisinopril  5 mg Oral Daily    ipratropium-albuterol  1 ampule Inhalation Q4H WA    cefepime  2,000 mg IntraVENous Q12H    aspirin  81 mg Oral Daily    budesonide-formoterol  2 puff Inhalation BID    gabapentin  400 mg Oral TID    metoprolol tartrate  25 mg Oral BID    pantoprazole  40 mg Oral QAM AC    sertraline  100 mg Oral Daily     Continuous Infusions:    sodium chloride       PRN Meds: cyclobenzaprine, sodium chloride, sodium chloride flush, sodium chloride, ondansetron **OR** ondansetron, polyethylene glycol, acetaminophen **OR** acetaminophen, albuterol, HYDROcodone 5 mg - acetaminophen    Data:     Past Medical History:   has a past medical history of Acid indigestion, Chest pain, Chronic back pain, Chronic obstructive lung disease ([address] Coronary atherosclerosis, Depression, Disseminated idiopathic skeletal hyperostosis, Fatigue, Hard of hearing, Hip pain, Hyperlipidemia, Hypertension, Old myocardial infarction, and Seasonal allergies. Social History:   reports that she has quit smoking. Her smoking use included cigarettes. She has never used smokeless tobacco. She reports that she does not drink alcohol and does not use drugs. Family History:   Family History   Family history unknown: Yes       Vitals:  /78   Pulse 82   Temp 98.4 °F (36.9 °C) (Oral)   Resp 14   Ht 5' 3\" (1.6 m)   Wt 174 lb 2.6 oz (79 kg)   SpO2 100%   BMI 30.85 kg/m²   Temp (24hrs), Av.4 °F (36.9 °C), Min:98.1 °F (36.7 °C), Max:98.6 °F (37 °C)    No results for input(s): POCGLU in the last 72 hours. I/O (24Hr):     Intake/Output Summary (Last 24 hours) at 2022 0934  Last data filed at 2022 0600  Gross per 24 hour   Intake --   Output 1100 ml   Net -1100 ml         Labs:  Hematology:  Recent Labs     22  0622  0829 22  0248   WBC 14.3* 12.8* 10.4   RBC 4.33 4.62 4.33   HGB 12.1 12.8 12.0   HCT 37.8 41.2 38.0   MCV 87.3 89.2 87.8   MCH 27.9 27.7 27.7   MCHC 32.0 31.1 31.6   RDW 14.0 14.1 14.0    236 229   MPV 9.9 10.2 10.2       Chemistry:  Recent Labs     22  1143 11/03/22  1436 11/04/22  0623 11/05/22  0829 11/05/22  2314 11/06/22  0248     --  141 138  --  137   K 3.1*  --  4.7 4.9  --  4.8     --  104 100  --  98   CO2 27  --  28 29  --  30   GLUCOSE 140*  --  138* 86  --  85   BUN 13  --  18 17  --  24*   CREATININE 0.46*  --  0.36* 0.40*  --  0.43*   MG 1.7  --  2.6 2.2  --  2.3   ANIONGAP 14  --  9 9  --  9   LABGLOM >60  --  >60 >60  --  >60   CALCIUM 9.6  --  9.0 9.0  --  9.6   PHOS 2.9  --  2.7 2.5*  --  3.7   PROBNP  --   --   --   --  1,413*  --    TROPHS 8 10  --   --   --   --        Recent Labs     11/03/22  1143 11/04/22  0623 11/05/22  0829 11/06/22  0248   LABALBU 4.0 3.9 3.7 3.7   TSH 0.37  --   --   --    CHOL  --  132  --   --    HDL  --  39*  --   --    LDLCHOLESTEROL  --  68  --   --    CHOLHDLRATIO  --  3.4  --   --    TRIG  --  125  --   --        ABG:No results found for: POCPH, PHART, PH, POCPCO2, RHW1ZBO, PCO2, POCPO2, PO2ART, PO2, POCHCO3, XTY9FKH, HCO3, NBEA, PBEA, BEART, BE, THGBART, THB, HYH5ZVI, BOWB7GQE, S6SRGXPL, O2SAT, FIO2  No results found for: SPECIAL  No results found for: CULTURE    Radiology:  XR CHEST (SINGLE VIEW FRONTAL)    Result Date: 11/3/2022  Interstitial prominence and bilateral pulmonary opacities, concerning for edema or pneumonia. Cardiomegaly. Physical Examination:        Physical Exam  Vitals reviewed. Constitutional:       General: She is not in acute distress. Appearance: She is ill-appearing. Interventions: She is not intubated. HENT:      Head: Normocephalic and atraumatic. Right Ear: External ear normal.      Left Ear: External ear normal.      Nose: Congestion and rhinorrhea present. Comments: High flow nasal cannula in place     Mouth/Throat:      Mouth: Mucous membranes are moist.   Eyes:      Extraocular Movements: Extraocular movements intact. Pupils: Pupils are equal, round, and reactive to light.    Cardiovascular:      Rate and Rhythm: Normal rate and regular rhythm. Pulses: Normal pulses. Heart sounds: No murmur heard. Pulmonary:      Effort: Tachypnea and prolonged expiration present. No respiratory distress. She is not intubated. Breath sounds: Decreased air movement present. Examination of the right-upper field reveals decreased breath sounds. Examination of the left-upper field reveals decreased breath sounds. Examination of the right-middle field reveals decreased breath sounds. Examination of the right-lower field reveals decreased breath sounds. Examination of the left-lower field reveals decreased breath sounds. Decreased breath sounds present. Abdominal:      General: There is no distension. Palpations: Abdomen is soft. Musculoskeletal:      Cervical back: Neck supple. Right lower leg: No edema. Left lower leg: No edema. Skin:     General: Skin is warm and dry. Capillary Refill: Capillary refill takes less than 2 seconds. Coloration: Skin is pale. Neurological:      General: No focal deficit present. Mental Status: She is alert and oriented to person, place, and time. Psychiatric:         Mood and Affect: Mood normal.         Behavior: Behavior normal.       Assessment:        Hospital Problems             Last Modified POA    * (Principal) COPD exacerbation ([address] 11/3/2022 Yes    Decompensated heart failure ([address] 11/3/2022 Yes    Acute respiratory failure with hypoxia ([address] 11/3/2022 Yes     Plan:        Acute on chronic COPD exacerbation. prednisone taper, continue IV antibiotis, try to wean down oxygen as tolerating, incentive spirometry, acapella  Echo revied and discussed with patient, continue lopressor, asa, statin, lisinopril  PT/OT  Discussed with patient about discharge planning today.  She is agreeable    Candice Chao MD  11/6/2022  9:34 AM

## 2022-11-06 NOTE — PLAN OF CARE
Problem: Respiratory - Adult  Goal: Achieves optimal ventilation and oxygenation  11/6/2022 0801 by Joe Ragland RCP  Outcome: Progressing   BRONCHOSPASM/BRONCHOCONSTRICTION     [x]         IMPROVE AERATION/BREATH SOUNDS  [x]   ADMINISTER BRONCHODILATOR THERAPY AS APPROPRIATE  [x]   ASSESS BREATH SOUNDS  []   IMPLEMENT AEROSOL/MDI PROTOCOL  [x]   PATIENT EDUCATION AS NEEDED   PROVIDE ADEQUATE OXYGENATION WITH ACCEPTABLE SP02/ABG'S    [x]  IDENTIFY APPROPRIATE OXYGEN THERAPY  [x]   MONITOR SP02/ABG'S AS NEEDED   [x]   PATIENT EDUCATION AS NEEDED

## 2022-11-06 NOTE — DISCHARGE INSTR - COC
Continuity of Care Form    Patient Name: Nubia Leigh   :  1955  MRN:  [id number]    Admit date:  11/3/2022  Discharge date:  ***    Code Status Order: DNR-CCA   Advance Directives:     Admitting Physician:  No admitting provider for patient encounter.   PCP: Alfredo Baumann    Discharging Nurse: [facility] Unit/Room#: 0889/9971-52  Discharging Unit Phone Number: ***    Emergency Contact:   Extended Emergency Contact Information  Primary Emergency Contact: Umu Bernard   [address] Phone: [phone]  Relation: Child    Past Surgical History:  Past Surgical History:   Procedure Laterality Date    CORONARY ANGIOPLASTY WITH [address]       Immunization History:   Immunization History   Administered Date(s) Administered    COVID-19, PFIZER PURPLE top, DILUTE for use, (age 15 y+), 30mcg/0.3mL 2021, 2021       Active Problems:  Patient Active Problem List   Diagnosis Code    Sensorineural hearing loss, bilateral H90.3    Bulging of thoracic intervertebral disc M51.34    Chronic left-sided thoracic back pain M54.6, G89.29    Other secondary kyphosis, thoracic region M40.14    Decompensated heart failure (HCC) I50.9    COPD exacerbation ([facility] J44.1    Acute respiratory failure with hypoxia ([facility] J96.01       Isolation/Infection:   Isolation            No Isolation          Patient Infection Status       None to display            Nurse Assessment:  Last Vital Signs: /78   Pulse 82   Temp 98.4 °F (36.9 °C) (Oral)   Resp 14   Ht 5' 3\" (1.6 m)   Wt 174 lb 2.6 oz (79 kg)   SpO2 100%   BMI 30.85 kg/m²     Last documented pain score (0-10 scale): Pain Level: 6  Last Weight:   Wt Readings from Last 1 Encounters:   22 174 lb 2.6 oz (79 kg)     Mental Status:  {IP PT MENTAL STATUS:}    IV Access:  { KYM IV ACCESS:864567949}    Nursing Mobility/ADLs:  Walking   {CHP DME IURD:200683985}  Transfer  {CHP DME DDTM:561272983}  Bathing  {CHP DME NYVL:898095353}  Dressing  {CHP DME EZLY:191224518}  Toileting  {CHP DME VHKW:515963692}  Feeding  {CHP DME TUDI:158138375}  Med Admin  {CHP DME FVD}  Med Delivery   { KYM MED Delivery:535437243}    Wound Care Documentation and Therapy:        Elimination:  Continence:    Bowel: {YES / HK:52362}  Bladder: {YES / CC:04666}  Urinary Catheter: {Urinary Catheter:779734277}   Colostomy/Ileostomy/Ileal Conduit: {YES / IP:31320}       Date of Last BM: ***    Intake/Output Summary (Last 24 hours) at 2022 0948  Last data filed at 2022 0600  Gross per 24 hour   Intake --   Output 1100 ml   Net -1100 ml     I/O last 3 completed shifts:  In: -   Out: 8286 [Urine:1750]    Safety Concerns:     [address] Safety Concerns:839284571}    Impairments/Disabilities:      [address] Impairments/Disabilities:394081236}    Nutrition Therapy:  Current Nutrition Therapy:   [address] Diet List:365028291}    Routes of Feeding: {[facility] DME Other Feedings:076454092}  Liquids: {Slp liquid thickness:99224}  Daily Fluid Restriction: {CHP DME Yes amt example:483480502}  Last Modified Barium Swallow with Video (Video Swallowing Test): {Done Not Done TIVZ:249617034}    Treatments at the Time of Hospital Discharge:   Respiratory Treatments: ***  Oxygen Therapy:  {Therapy; copd oxygen:05748}  Ventilator:    { CC Vent TJWC:093143499}    Rehab Therapies: {THERAPEUTIC INTERVENTION:9599629716}  Weight Bearing Status/Restrictions: [address]  Weight Bearin}  Other Medical Equipment (for information only, NOT a DME order):  {EQUIPMENT:793220194}  Other Treatments: ***    Patient's personal belongings (please select all that are sent with patient):  {[facility] DME Belongings:169619904}    RN SIGNATURE:  {Esignature:650084689}    CASE MANAGEMENT/SOCIAL WORK SECTION    Inpatient Status Date: ***    Readmission Risk Assessment Score:  Readmission Risk              Risk of Unplanned Readmission:  10           Discharging to Facility/ Agency Name:   Address:  Phone:  Fax:    Dialysis Facility (if applicable)   Name:  Address:  Dialysis Schedule:  Phone:  Fax:    / signature: {Esignature:793349502}    PHYSICIAN SECTION    Prognosis: Guarded    Condition at Discharge: Stable    Rehab Potential (if transferring to Rehab): Guarded    Recommended Labs or Other Treatments After Discharge: DNR-CCA, PT< OT, nursing evalution and tratment, follow up PCP, Follow up pulmonology, CT in 4 weeks. Physician Certification: I certify the above information and transfer of Brandon Amin  is necessary for the continuing treatment of the diagnosis listed and that she requires [address] for less 30 days.      Update Admission H&P: No change in H&P    PHYSICIAN SIGNATURE:  Electronically signed by Adriel Galdamez MD on 11/6/22 at 9:48 AM EST

## 2022-11-06 NOTE — PROGRESS NOTES
Patient states that she feels \"terrible\". She is dry heaving and spitting up mucus. Dr. Sánchez Mt notified. RN will continue to monitor.   Electronically signed by Abe Merrill RN on 11/6/2022 at 11:11 AM

## 2022-11-07 VITALS
HEART RATE: 85 BPM | SYSTOLIC BLOOD PRESSURE: 98 MMHG | WEIGHT: 174.16 LBS | HEIGHT: 63 IN | RESPIRATION RATE: 20 BRPM | DIASTOLIC BLOOD PRESSURE: 64 MMHG | BODY MASS INDEX: 30.86 KG/M2 | TEMPERATURE: 97.7 F | OXYGEN SATURATION: 92 %

## 2022-11-07 LAB
ALBUMIN SERPL-MCNC: 4 G/DL (ref 3.5–5.2)
ANION GAP SERPL CALCULATED.3IONS-SCNC: 10 MMOL/L (ref 9–17)
BUN BLDV-MCNC: 18 MG/DL (ref 8–23)
CALCIUM SERPL-MCNC: 9.4 MG/DL (ref 8.6–10.4)
CHLORIDE BLD-SCNC: 97 MMOL/L (ref 98–107)
CO2: 29 MMOL/L (ref 20–31)
CREAT SERPL-MCNC: 0.37 MG/DL (ref 0.5–0.9)
GFR SERPL CREATININE-BSD FRML MDRD: >60 ML/MIN/1.73M2
GLUCOSE BLD-MCNC: 91 MG/DL (ref 70–99)
HCT VFR BLD CALC: 42.5 % (ref 36.3–47.1)
HEMOGLOBIN: 13.3 G/DL (ref 11.9–15.1)
MAGNESIUM: 2.1 MG/DL (ref 1.6–2.6)
MCH RBC QN AUTO: 27.4 PG (ref 25.2–33.5)
MCHC RBC AUTO-ENTMCNC: 31.3 G/DL (ref 28.4–34.8)
MCV RBC AUTO: 87.6 FL (ref 82.6–102.9)
NRBC AUTOMATED: 0 PER 100 WBC
PDW BLD-RTO: 13.5 % (ref 11.8–14.4)
PHOSPHORUS: 3.9 MG/DL (ref 2.6–4.5)
PLATELET # BLD: 255 K/UL (ref 138–453)
PMV BLD AUTO: 10.1 FL (ref 8.1–13.5)
POTASSIUM SERPL-SCNC: 4.2 MMOL/L (ref 3.7–5.3)
RBC # BLD: 4.85 M/UL (ref 3.95–5.11)
SODIUM BLD-SCNC: 136 MMOL/L (ref 135–144)
WBC # BLD: 9.4 K/UL (ref 3.5–11.3)

## 2022-11-07 PROCEDURE — 83735 ASSAY OF MAGNESIUM: CPT

## 2022-11-07 PROCEDURE — 6370000000 HC RX 637 (ALT 250 FOR IP): Performed by: INTERNAL MEDICINE

## 2022-11-07 PROCEDURE — 6370000000 HC RX 637 (ALT 250 FOR IP): Performed by: NURSE PRACTITIONER

## 2022-11-07 PROCEDURE — 99232 SBSQ HOSP IP/OBS MODERATE 35: CPT | Performed by: STUDENT IN AN ORGANIZED HEALTH CARE EDUCATION/TRAINING PROGRAM

## 2022-11-07 PROCEDURE — 36415 COLL VENOUS BLD VENIPUNCTURE: CPT

## 2022-11-07 PROCEDURE — 94660 CPAP INITIATION&MGMT: CPT

## 2022-11-07 PROCEDURE — 6370000000 HC RX 637 (ALT 250 FOR IP): Performed by: STUDENT IN AN ORGANIZED HEALTH CARE EDUCATION/TRAINING PROGRAM

## 2022-11-07 PROCEDURE — 80069 RENAL FUNCTION PANEL: CPT

## 2022-11-07 PROCEDURE — 6360000002 HC RX W HCPCS: Performed by: INTERNAL MEDICINE

## 2022-11-07 PROCEDURE — 94640 AIRWAY INHALATION TREATMENT: CPT

## 2022-11-07 PROCEDURE — 6360000002 HC RX W HCPCS: Performed by: STUDENT IN AN ORGANIZED HEALTH CARE EDUCATION/TRAINING PROGRAM

## 2022-11-07 PROCEDURE — 2580000003 HC RX 258: Performed by: NURSE PRACTITIONER

## 2022-11-07 PROCEDURE — 2700000000 HC OXYGEN THERAPY PER DAY

## 2022-11-07 PROCEDURE — 94761 N-INVAS EAR/PLS OXIMETRY MLT: CPT

## 2022-11-07 PROCEDURE — 6360000002 HC RX W HCPCS: Performed by: NURSE PRACTITIONER

## 2022-11-07 PROCEDURE — 85027 COMPLETE CBC AUTOMATED: CPT

## 2022-11-07 PROCEDURE — 2500000003 HC RX 250 WO HCPCS: Performed by: INTERNAL MEDICINE

## 2022-11-07 RX ORDER — ACETAZOLAMIDE 500 MG/5ML
250 INJECTION, POWDER, LYOPHILIZED, FOR SOLUTION INTRAVENOUS ONCE
Status: COMPLETED | OUTPATIENT
Start: 2022-11-07 | End: 2022-11-07

## 2022-11-07 RX ADMIN — HYDROCODONE BITARTRATE AND ACETAMINOPHEN 1 TABLET: 5; 325 TABLET ORAL at 04:14

## 2022-11-07 RX ADMIN — SODIUM CHLORIDE, PRESERVATIVE FREE 10 ML: 5 INJECTION INTRAVENOUS at 09:21

## 2022-11-07 RX ADMIN — GABAPENTIN 400 MG: 400 CAPSULE ORAL at 14:22

## 2022-11-07 RX ADMIN — CEFEPIME 2000 MG: 2 INJECTION, POWDER, FOR SOLUTION INTRAVENOUS at 12:28

## 2022-11-07 RX ADMIN — HYDROCODONE BITARTRATE AND ACETAMINOPHEN 1 TABLET: 5; 325 TABLET ORAL at 17:15

## 2022-11-07 RX ADMIN — PREDNISONE 30 MG: 20 TABLET ORAL at 09:14

## 2022-11-07 RX ADMIN — IPRATROPIUM BROMIDE AND ALBUTEROL SULFATE 1 AMPULE: .5; 2.5 SOLUTION RESPIRATORY (INHALATION) at 11:52

## 2022-11-07 RX ADMIN — HYDROCODONE BITARTRATE AND ACETAMINOPHEN 1 TABLET: 5; 325 TABLET ORAL at 10:46

## 2022-11-07 RX ADMIN — ENOXAPARIN SODIUM 40 MG: 100 INJECTION SUBCUTANEOUS at 09:18

## 2022-11-07 RX ADMIN — PANTOPRAZOLE SODIUM 40 MG: 40 TABLET, DELAYED RELEASE ORAL at 09:14

## 2022-11-07 RX ADMIN — Medication 81 MG: at 09:15

## 2022-11-07 RX ADMIN — GABAPENTIN 400 MG: 400 CAPSULE ORAL at 09:15

## 2022-11-07 RX ADMIN — SERTRALINE 100 MG: 50 TABLET, FILM COATED ORAL at 09:14

## 2022-11-07 RX ADMIN — ACETAZOLAMIDE 250 MG: 500 INJECTION, POWDER, LYOPHILIZED, FOR SOLUTION INTRAVENOUS at 09:31

## 2022-11-07 RX ADMIN — CYCLOBENZAPRINE 10 MG: 10 TABLET, FILM COATED ORAL at 09:30

## 2022-11-07 RX ADMIN — LISINOPRIL 5 MG: 5 TABLET ORAL at 09:15

## 2022-11-07 RX ADMIN — METOPROLOL TARTRATE 25 MG: 25 TABLET ORAL at 09:15

## 2022-11-07 RX ADMIN — FLUTICASONE PROPIONATE 2 SPRAY: 50 SPRAY, METERED NASAL at 09:18

## 2022-11-07 RX ADMIN — CYCLOBENZAPRINE 10 MG: 10 TABLET, FILM COATED ORAL at 17:15

## 2022-11-07 ASSESSMENT — ENCOUNTER SYMPTOMS
BACK PAIN: 0
EYE ITCHING: 0
EYE DISCHARGE: 0
CONSTIPATION: 0
FACIAL SWELLING: 0
CHEST TIGHTNESS: 0
COLOR CHANGE: 0
ABDOMINAL DISTENTION: 0
DIARRHEA: 0
CHOKING: 0
ABDOMINAL PAIN: 0
COUGH: 0
SHORTNESS OF BREATH: 0
RHINORRHEA: 0
APNEA: 0
SINUS PRESSURE: 0

## 2022-11-07 ASSESSMENT — PAIN SCALES - GENERAL
PAINLEVEL_OUTOF10: 10
PAINLEVEL_OUTOF10: 6
PAINLEVEL_OUTOF10: 3
PAINLEVEL_OUTOF10: 6

## 2022-11-07 ASSESSMENT — PAIN DESCRIPTION - LOCATION: LOCATION: SHOULDER

## 2022-11-07 NOTE — CARE COORDINATION
Transitional Planning  Spoke with patient at bedside. Plan to return home independently. Has ride. Oxygen tank in room. Refusing home care. Patient in the process of changing oxygen company from [employer] [place] [address] in [address].

## 2022-11-07 NOTE — PROGRESS NOTES
Physician Progress Note      Mariann Restrepo  [facility] #:                  [id number]  :                       1955  ADMIT DATE:       11/3/2022 10:43 AM  DISCH DATE:  RESPONDING  PROVIDER #:        Pedro Lu MD          QUERY TEXT:    Pt admitted with Acute on chronic COPD exacerbation and noted documentation of   Decompensated CHF received iv Lasix. Echo shows normal systolic function and   grade 2 diastolic dysfunction with EF 52%. Please clarify one of the following: The medical record reflects the following:  Risk Factors: htn, chf, copd  Clinical Indicators:   admitted with Acute on chronic COPD exacerbation and   noted documentation of Decompensated CHF received iv Lasix. Echo shows normal   systolic function and grade 2 diastolic dysfunction with EF 52%  Treatment: monitoring, iv lasix, Echo    Thank [address]  Email [email or web address]  Cell [phone]  office hours M-F 6am to 2:30pm  Options provided:  -- Acute on Chronic Diastolic CHF/HFpEF  -- Acute on Chronic Systolic and Diastolic CHF  -- Acute Diastolic CHF/HFpEF  -- Acute Systolic and Diastolic CHF  -- Other - I will add my own diagnosis  -- Disagree - Not applicable / Not valid  -- Disagree - Clinically unable to determine / Unknown  -- Refer to Clinical Documentation Reviewer    PROVIDER RESPONSE TEXT:    This patient is in acute on chronic diastolic CHF/HFpEF. Query created by: Helga Bustamante on 2022 1:57 PM      QUERY TEXT:    Patient admitted with Acute respiratory failure, copd exacerbation and chf   decompensation. Per lab wbc on admission 13.3.12.8>9.4 and cxr on 11/3 shows   pulmonary opacities. ordered on iv cefepime. CT chest showed pulmonary   fibrosis and bronchiectasis. Please clarify one of the following:     The medical record reflects the following:  Risk Factors: copd, chf,  Clinical Indicators: admitted with Acute respiratory failure, copd   exacerbation and chf

## 2022-11-07 NOTE — PROGRESS NOTES
[facility]  Office: [address], DO, Amalia Singh, DO, Humberto Lacy, DO, Flor Domenic Blood, DO, Gaetano Araiza MD, Duncan Lima MD, Aly Falk MD, Jennifer Duong MD,  Skinny Alex MD, Anthony Oliveros MD, Jaren Sosa, DO, Edd Messina MD,  Maria Teresa Mack MD, Ashwin Hill MD, Manjinder Berry, DO, Miranda Santos MD, Janis Urban MD, Jacob Sanders, DO, Lilo Torrez MD, Jackson Savage MD, Mitra Candelario MD, Keisha Sylvester MD, Seng Tejeda, DO, Gail Swann MD, Adal Xiao MD, Dany Krause, CNP,  Jimenez José, CNP, Craig Sever, CNP, Rigo Phan, CNP,  Stevo Cope, [facility], Julien Lundy, CNP, Isaak Mitchell, CNP, Sonia Willis, CNP, Ella Salvador, CNP, Marky Martini, CNP, Megha Rosas, PA-C, Amanda Chilel, CNS, Joey Ferrara, DNP, Yi Gillespie, CNP, Heidi Araya, CNP, Erin Rainey, CNP         [address]    Progress Note    11/7/2022    1:54 PM    Name:   Roula Ayala  MRN:     [id number]     [place]:      [de-identified]   Room:   [address] [place] [address] Day:  4  Admit Date:  11/3/2022 10:43 AM    PCP:   Sharan Santiago  Code Status:  DNR-CCA    Subjective:     C/C: shortness of breath  Interval History Status: improved. Patient seen and examined. On high flow, doing ok, no other issues. Unfortunately required 6 L nasal cannula oxygen yesterday prior to planned discharge. Brief History:     75year old female with history of COPD chronically on 4L nasal cannula, presented to the ER with 3-4 of shortness of breath, patient required non re breather and high flow nasal cannula and has improved. Patient weaning down on high flow nasal cannula. Echo ordered due to selam for possible mixed COPD/CHF picture because respiratory effort has improed with aggressive diuresis. Echo:  Summary  Left ventricle is normal in size.  Global left ventricular systolic function  is normal. Calculated ejection fraction 52% by Mahoney's method. Grade II (moderate) left ventricular diastolic dysfunction. Mildly dilated right ventricular cavity. Normal right ventricular function. Trivial tricuspid regurgitation. Mild pulmonary hypertension. Estimated right ventricular systolic pressure  is 53FEYZ. Review of Systems:     Review of Systems   Constitutional:  Negative for activity change, appetite change, chills and fever. HENT:  Negative for congestion, ear pain, facial swelling, mouth sores, rhinorrhea and sinus pressure. Eyes:  Negative for discharge and itching. Respiratory:  Negative for apnea, cough, choking, chest tightness and shortness of breath. Cardiovascular:  Negative for chest pain and leg swelling. Gastrointestinal:  Negative for abdominal distention, abdominal pain, constipation and diarrhea. Endocrine: Negative for cold intolerance, polyphagia and polyuria. Genitourinary:  Negative for difficulty urinating, dysuria and flank pain. Musculoskeletal:  Negative for arthralgias, back pain and joint swelling. Skin:  Negative for color change and wound. Neurological:  Negative for dizziness, seizures, light-headedness and headaches. Psychiatric/Behavioral:  Negative for agitation, behavioral problems and self-injury. Medications: Allergies: Allergies   Allergen Reactions    Augmentin [Amoxicillin-Pot Clavulanate] Hives    Iv Dye [Iodides]     Talwin [Pentazocine]      Migraine.        Current Meds:   Scheduled Meds:    fluticasone  2 spray Each Nostril Daily    predniSONE  30 mg Oral Daily    Followed by    Dallas Lehman ON 11/10/2022] predniSONE  20 mg Oral Daily    Followed by    Dallas Lehman ON 11/13/2022] predniSONE  10 mg Oral Daily    sodium chloride flush  5-40 mL IntraVENous 2 times per day    enoxaparin  40 mg SubCUTAneous Daily    lisinopril  5 mg Oral Daily    ipratropium-albuterol  1 ampule Inhalation Q4H WA    cefepime  2,000 mg IntraVENous Q12H aspirin  81 mg Oral Daily    budesonide-formoterol  2 puff Inhalation BID    gabapentin  400 mg Oral TID    metoprolol tartrate  25 mg Oral BID    pantoprazole  40 mg Oral QAM AC    sertraline  100 mg Oral Daily     Continuous Infusions:    sodium chloride       PRN Meds: cyclobenzaprine, sodium chloride, sodium chloride flush, sodium chloride, ondansetron **OR** ondansetron, polyethylene glycol, acetaminophen **OR** acetaminophen, albuterol, HYDROcodone 5 mg - acetaminophen    Data:     Past Medical History:   has a past medical history of Acid indigestion, Chest pain, Chronic back pain, Chronic obstructive lung disease ([address] Coronary atherosclerosis, Depression, Disseminated idiopathic skeletal hyperostosis, Fatigue, Hard of hearing, Hip pain, Hyperlipidemia, Hypertension, Old myocardial infarction, and Seasonal allergies. Social History:   reports that she has quit smoking. Her smoking use included cigarettes. She has never used smokeless tobacco. She reports that she does not drink alcohol and does not use drugs. Family History:   Family History   Family history unknown: Yes       Vitals:  BP 98/64   Pulse 85   Temp 97.7 °F (36.5 °C) (Oral)   Resp 21   Ht 5' 3\" (1.6 m)   Wt 174 lb 2.6 oz (79 kg)   SpO2 92%   BMI 30.85 kg/m²   Temp (24hrs), Av.1 °F (36.7 °C), Min:97.7 °F (36.5 °C), Max:98.6 °F (37 °C)    No results for input(s): POCGLU in the last 72 hours. I/O (24Hr):   No intake or output data in the 24 hours ending 22 1354      Labs:  Hematology:  Recent Labs     22  0829 22  0248 22  0549   WBC 12.8* 10.4 9.4   RBC 4.62 4.33 4.85   HGB 12.8 12.0 13.3   HCT 41.2 38.0 42.5   MCV 89.2 87.8 87.6   MCH 27.7 27.7 27.4   MCHC 31.1 31.6 31.3   RDW 14.1 14.0 13.5    229 255   MPV 10.2 10.2 10.1       Chemistry:  Recent Labs     22  0829 22  2314 22  0248 22  0549     --  137 136   K 4.9  --  4.8 4.2     --  98 97*   CO2 29  --  30 29   GLUCOSE 86  --  85 91   BUN 17  --  24* 18   CREATININE 0.40*  --  0.43* 0.37*   MG 2.2  --  2.3 2.1   ANIONGAP 9  --  9 10   LABGLOM >60  --  >60 >60   CALCIUM 9.0  --  9.6 9.4   PHOS 2.5*  --  3.7 3.9   PROBNP  --  1,413*  --   --        Recent Labs     11/05/22  0829 11/06/22  0248 11/07/22  0549   LABALBU 3.7 3.7 4.0       ABG:No results found for: POCPH, PHART, PH, POCPCO2, DIF9SQZ, PCO2, POCPO2, PO2ART, PO2, POCHCO3, EGY4ZWL, HCO3, NBEA, PBEA, BEART, BE, THGBART, THB, WYU2WGZ, EOGZ1NMF, L7BKLFSL, O2SAT, FIO2  No results found for: SPECIAL  No results found for: CULTURE    Radiology:  XR CHEST (SINGLE VIEW FRONTAL)    Result Date: 11/3/2022  Interstitial prominence and bilateral pulmonary opacities, concerning for edema or pneumonia. Cardiomegaly. Physical Examination:        Physical Exam  Vitals reviewed. Constitutional:       General: She is not in acute distress. Appearance: She is ill-appearing. Interventions: She is not intubated. HENT:      Head: Normocephalic and atraumatic. Right Ear: External ear normal.      Left Ear: External ear normal.      Nose: Congestion and rhinorrhea present. Comments: High flow nasal cannula in place     Mouth/Throat:      Mouth: Mucous membranes are moist.   Eyes:      Extraocular Movements: Extraocular movements intact. Pupils: Pupils are equal, round, and reactive to light. Cardiovascular:      Rate and Rhythm: Normal rate and regular rhythm. Pulses: Normal pulses. Heart sounds: No murmur heard. Pulmonary:      Effort: Tachypnea and prolonged expiration present. No respiratory distress. She is not intubated. Breath sounds: Decreased air movement present. Examination of the right-upper field reveals decreased breath sounds. Examination of the left-upper field reveals decreased breath sounds. Examination of the right-middle field reveals decreased breath sounds.  Examination of the right-lower field reveals decreased breath sounds. Examination of the left-lower field reveals decreased breath sounds. Decreased breath sounds present. Abdominal:      General: There is no distension. Palpations: Abdomen is soft. Musculoskeletal:      Cervical back: Neck supple. Right lower leg: No edema. Left lower leg: No edema. Skin:     General: Skin is warm and dry. Capillary Refill: Capillary refill takes less than 2 seconds. Coloration: Skin is pale. Neurological:      General: No focal deficit present. Mental Status: She is alert and oriented to person, place, and time. Psychiatric:         Mood and Affect: Mood normal.         Behavior: Behavior normal.       Assessment:        Hospital Problems             Last Modified POA    * (Principal) COPD exacerbation ([facility] 11/3/2022 Yes    Decompensated heart failure ([address] 11/3/2022 Yes    Acute respiratory failure with hypoxia ([address] 11/3/2022 Yes   Plan:        Acute on chronic COPD exacerbation. prednisone taper, continue IV antibiotis, try to wean down oxygen as tolerating, incentive spirometry, acapella  1 dose Diamox  Echo revied and discussed with patient, continue lopressor, asa, statin, lisinopril  PT/OT  Wean down oxygen as tolerated currently on 4 L. If stable on 4 L plan to late discharge later today.     Radha Nuñez MD  11/7/2022  1:54 PM

## 2022-11-15 NOTE — DISCHARGE SUMMARY
[facility]  Office: [address], DO, Candice Marcos, DO, Maldonado Ritchie, DO, Yulissa Cowart, DO, Les Cormier MD, Claus Daniels MD, Sydney Valero MD, Onel Rivero MD,  Cristo Esquivel MD, oHlly Mayers MD, Kelsey Banrett DO, Melissa Rodriguez MD,  Juve Ray, DO, Nellie Hull MD, Elham Evangelista MD, Shima Jones, DO, Julia Null MD, Lakesha Sung MD, Jeison Iraheta DO, Nicholas Carlson MD, Nathalia Steven MD, Ajay Croft MD, Maximino Guajardo MD, Arabella Freed DO, Shantanu Owen MD, Yahaira Mesa MD, Sean Bustos, CNP,  Corey Corbin, CNP, Gavi Menjivar, CNP, Daksha Ventura, CNP,  Janelle Alcala, [facility], Praveen Moore, CNP, Jessica Slater, CNP, Eduardo Staples, CNP, Carmine Jack, CNP, Ollie Malik, CNP, Rebecca Martinez PA-C, Greta Celestin, [facility], Bianca Cantu, [facility], Gabriel Gage, CNP, Era Mcarthur, CNP, Jesús Crow, CNP         [address]    Discharge Summary     Patient ID: Denys Kimball  :  1955   MRN: [id number]     ACCOUNT:  [de-identified]   Patient's PCP: Cecile Babinski Date: 11/3/2022   Discharge Date: 2022     Length of Stay: 4  Code Status:  Prior  Admitting Physician: Lakesha Sung MD  Discharge Physician: Melissa Rodriguez MD     Active Discharge Diagnoses:     Hospital Problem Lists:  Principal Problem:    COPD exacerbation [facility])  Active Problems:    Decompensated heart failure [facility])    Acute respiratory failure with hypoxia [facility])  Resolved Problems:    * No resolved hospital problems.  *      Admission Condition:  stable     Discharged Condition: stable    Hospital Stay:     Hospital Course:  Denys Kimball is a 75 y.o. female who was admitted for the management of   COPD exacerbation ([facility] , presented to ER with shortnes of breath    75year old female with history of COPD chronically on 4L nasal cannula, presented to the ER with 3-4 of shortness of breath, patient required non re breather and high flow nasal cannula and has improved. Patient weaning down on high flow nasal cannula. Echo ordered due to cocnern for possible mixed COPD/CHF picture because respiratory effort has improed with aggressive diuresis. Echo:  Summary  Left ventricle is normal in size. Global left ventricular systolic function  is normal. Calculated ejection fraction 52% by Mahoney's method. Grade II (moderate) left ventricular diastolic dysfunction. Mildly dilated right ventricular cavity. Normal right ventricular function. Trivial tricuspid regurgitation. Mild pulmonary hypertension. Estimated right ventricular systolic pressure  is 73FTEM. Significant therapeutic interventions: See above    Significant Diagnostic Studies:   Labs / Micro:  CBC:   Lab Results   Component Value Date/Time    WBC 9.4 11/07/2022 05:49 AM    RBC 4.85 11/07/2022 05:49 AM    HGB 13.3 11/07/2022 05:49 AM    HCT 42.5 11/07/2022 05:49 AM    MCV 87.6 11/07/2022 05:49 AM    MCH 27.4 11/07/2022 05:49 AM    MCHC 31.3 11/07/2022 05:49 AM    RDW 13.5 11/07/2022 05:49 AM     11/07/2022 05:49 AM     BMP:    Lab Results   Component Value Date/Time    GLUCOSE 91 11/07/2022 05:49 AM     11/07/2022 05:49 AM    K 4.2 11/07/2022 05:49 AM    CL 97 11/07/2022 05:49 AM    CO2 29 11/07/2022 05:49 AM    ANIONGAP 10 11/07/2022 05:49 AM    BUN 18 11/07/2022 05:49 AM    CREATININE 0.37 11/07/2022 05:49 AM    CALCIUM 9.4 11/07/2022 05:49 AM    LABGLOM >60 11/07/2022 05:49 AM     HFP:  No results found for: ALB, PROT     Radiology:  No results found. Consultations:    Consults:     Final Specialist Recommendations/Findings:   IP CONSULT TO DIETITIAN  IP CONSULT TO HEART FAILURE NURSE/COORDINATOR  IP CONSULT TO HOME CARE NEEDS      The patient was seen and examined on day of discharge and this discharge summary is in conjunction with any daily progress note from day of discharge.     Discharge plan:     Disposition: Home    Physician Follow Up:     [address] [facility] [place]   [phone]    Follow up         Requiring Further Evaluation/Follow Up POST HOSPITALIZATION/Incidental Findings: follow up PCP, follow-up pulmonology as outpatient. Wean down oxygen as tolerated. Diet: cardiac diet    Activity: As tolerated    Instructions to Patient: follow up PCP, follow-up pulmonology as outpatient. Wean down oxygen as tolerated. Discharge Medications:      Medication List        START taking these medications      cyclobenzaprine 10 MG tablet  Commonly known as: FLEXERIL  Take 1 tablet by mouth 3 times daily as needed for Muscle spasms     predniSONE 10 MG tablet  Commonly known as: DELTASONE  Take 3 tablets by mouth daily for 3 days, THEN 2 tablets daily for 3 days, THEN 1 tablet daily for 3 days.   Start taking on: November 6, 2022            CHANGE how you take these medications      lisinopril 5 MG tablet  Commonly known as: PRINIVIL;ZESTRIL  Take 1 tablet by mouth daily  What changed:   medication strength  how much to take            CONTINUE taking these medications      albuterol sulfate  (90 Base) MCG/ACT inhaler  Commonly known as: PROVENTIL;VENTOLIN;PROAIR     aspirin 81 MG tablet     calcium carbonate 1500 (600 Ca) MG Tabs tablet     cromolyn 4 % ophthalmic solution  Commonly known as: OPTICROM     diclofenac sodium 1 % Gel  Commonly known as: VOLTAREN     doxepin 10 MG capsule  Commonly known as: SINEQUAN     fenofibrate micronized 134 MG capsule  Commonly known as: LOFIBRA     fluticasone 50 MCG/ACT nasal spray  Commonly known as: FLONASE     fluticasone-salmeterol 500-50 MCG/DOSE diskus inhaler  Commonly known as: ADVAIR     gabapentin 400 MG capsule  Commonly known as: NEURONTIN     ipratropium-albuterol 0.5-2.5 (3) MG/3ML Soln nebulizer solution  Commonly known as: DUONEB     metoprolol tartrate 25 MG tablet  Commonly known as: LOPRESSOR     omeprazole 20 MG delayed release capsule  Commonly known as: PRILOSEC     PREMPRO PO     sertraline 100 MG tablet  Commonly known as: ZOLOFT     VICODIN PO            STOP taking these medications      celecoxib 200 MG capsule  Commonly known as: CELEBREX     FLUoxetine 20 MG capsule  Commonly known as: PROZAC            ASK your doctor about these medications      doxycycline hyclate 100 MG tablet  Commonly known as: VIBRA-TABS  Take 1 tablet by mouth 2 times daily for 5 days  Ask about: Should I take this medication? Where to Get Your Medications        These medications were sent to [facility] [facility], [address] [place] [address] -  [phone]  [place] [id number]      Phone: [phone]   cyclobenzaprine 10 MG tablet  doxycycline hyclate 100 MG tablet  lisinopril 5 MG tablet  predniSONE 10 MG tablet         Discharge Procedure Orders   CT CHEST WO CONTRAST   Standing Status: Future Standing Exp. Date: 11/06/23     Order Specific Question Answer Comments   Reason for exam: Monitor mediastinal lymphadenopathy        Time Spent on discharge is  31 mins in patient examination, evaluation, counseling as well as medication reconciliation, prescriptions for required medications, discharge plan and follow up. Electronically signed by   Az Vasquez MD  11/15/2022  4:53 PM      Thank you Anu Sams for the opportunity to be involved in this patient's care.

## 2022-12-08 ENCOUNTER — HOSPITAL ENCOUNTER (OUTPATIENT)
Dept: MEDSURG UNIT | Age: 67
Discharge: HOME | End: 2022-12-08
Attending: INTERNAL MEDICINE | Admitting: INTERNAL MEDICINE